# Patient Record
Sex: MALE | Race: WHITE | NOT HISPANIC OR LATINO | Employment: OTHER | URBAN - METROPOLITAN AREA
[De-identification: names, ages, dates, MRNs, and addresses within clinical notes are randomized per-mention and may not be internally consistent; named-entity substitution may affect disease eponyms.]

---

## 2018-02-05 ENCOUNTER — OFFICE VISIT (OUTPATIENT)
Dept: SPORTS MEDICINE | Facility: CLINIC | Age: 73
End: 2018-02-05
Payer: MEDICARE

## 2018-02-05 ENCOUNTER — HOSPITAL ENCOUNTER (OUTPATIENT)
Dept: RADIOLOGY | Facility: HOSPITAL | Age: 73
Discharge: HOME | End: 2018-02-05
Attending: ORTHOPAEDIC SURGERY
Payer: MEDICARE

## 2018-02-05 VITALS
DIASTOLIC BLOOD PRESSURE: 73 MMHG | HEART RATE: 80 BPM | HEIGHT: 69 IN | WEIGHT: 202 LBS | SYSTOLIC BLOOD PRESSURE: 124 MMHG | BODY MASS INDEX: 29.92 KG/M2

## 2018-02-05 DIAGNOSIS — M25.562 LEFT KNEE PAIN, UNSPECIFIED CHRONICITY: ICD-10-CM

## 2018-02-05 DIAGNOSIS — M17.12 PRIMARY OSTEOARTHRITIS OF LEFT KNEE: ICD-10-CM

## 2018-02-05 DIAGNOSIS — M25.562 LEFT KNEE PAIN, UNSPECIFIED CHRONICITY: Primary | ICD-10-CM

## 2018-02-05 PROCEDURE — 99999 PR PBB SHADOW E&M-NEW PATIENT-LVL III: CPT | Mod: PBBFAC,,, | Performed by: ORTHOPAEDIC SURGERY

## 2018-02-05 PROCEDURE — 3008F BODY MASS INDEX DOCD: CPT | Mod: S$GLB,,, | Performed by: ORTHOPAEDIC SURGERY

## 2018-02-05 PROCEDURE — 73564 X-RAY EXAM KNEE 4 OR MORE: CPT | Mod: 26,50,, | Performed by: RADIOLOGY

## 2018-02-05 PROCEDURE — 99204 OFFICE O/P NEW MOD 45 MIN: CPT | Mod: S$GLB,,, | Performed by: ORTHOPAEDIC SURGERY

## 2018-02-05 PROCEDURE — 1159F MED LIST DOCD IN RCRD: CPT | Mod: S$GLB,,, | Performed by: ORTHOPAEDIC SURGERY

## 2018-02-05 PROCEDURE — 73564 X-RAY EXAM KNEE 4 OR MORE: CPT | Mod: TC,50,FY,PO

## 2018-02-05 PROCEDURE — 1125F AMNT PAIN NOTED PAIN PRSNT: CPT | Mod: S$GLB,,, | Performed by: ORTHOPAEDIC SURGERY

## 2018-02-05 RX ORDER — AMLODIPINE BESYLATE 10 MG/1
10 TABLET ORAL DAILY
COMMUNITY

## 2018-02-05 RX ORDER — LISINOPRIL 10 MG/1
10 TABLET ORAL DAILY
COMMUNITY

## 2018-02-05 RX ORDER — OMEPRAZOLE 20 MG/1
20 CAPSULE, DELAYED RELEASE ORAL
COMMUNITY

## 2018-02-05 RX ORDER — LEVOTHYROXINE SODIUM 50 UG/1
TABLET ORAL DAILY
COMMUNITY

## 2018-02-05 RX ORDER — TAMSULOSIN HYDROCHLORIDE 0.4 MG/1
0.4 CAPSULE ORAL DAILY
COMMUNITY
Start: 2017-06-06

## 2018-02-05 RX ORDER — IBUPROFEN 800 MG/1
600 TABLET ORAL EVERY 6 HOURS PRN
COMMUNITY

## 2018-02-05 RX ORDER — ATORVASTATIN CALCIUM 20 MG/1
20 TABLET, FILM COATED ORAL DAILY
COMMUNITY

## 2018-02-05 NOTE — PROGRESS NOTES
Subjective:          Chief Complaint: Sudheer Gipson is a 72 y.o. male who had concerns including Pain of the Left Knee.    Pt reports left knee pain for many years. Has history of R TKA by Dr. Terry in [place]. Also reports history of left knee injections by Dr. Terry with last being ~2 mo prior.             Review of Systems   Constitution: Negative for fever and night sweats.   HENT: Negative for hearing loss.    Eyes: Negative for blurred vision and visual disturbance.   Cardiovascular: Negative for chest pain and leg swelling.   Respiratory: Negative for shortness of breath.    Endocrine: Negative for polyuria.   Hematologic/Lymphatic: Negative for bleeding problem.   Skin: Negative for rash.   Musculoskeletal: Positive for joint pain. Negative for back pain, joint swelling, muscle cramps and muscle weakness.   Gastrointestinal: Negative for melena.   Genitourinary: Negative for hematuria.   Neurological: Negative for loss of balance, numbness and paresthesias.   Psychiatric/Behavioral: Negative for altered mental status.       Pain Related Questions  Over the past 3 days, what was your average pain during activity? (I.e. running, jogging, walking, climbing stairs, getting dressed, ect.): 10  Over the past 3 days, what was your highest pain level?: 10  Over the past 3 days, what was your lowest pain level? : 3    Other  How many nights a week are you awakened by your affected body part?: 0  Was the patient's HEIGHT measured or patient reported?: Patient Reported  Was the patient's WEIGHT measured or patient reported?: Measured      Objective:        General: Sudheer is well-developed, well-nourished, appears stated age, in no acute distress, alert and oriented to time, place and person.     General    Vitals reviewed.  Constitutional: He is oriented to person, place, and time. He appears well-developed and well-nourished. No distress.   HENT:   Mouth/Throat: No oropharyngeal exudate.   Eyes: Right eye  exhibits no discharge. Left eye exhibits no discharge.   Neck: Normal range of motion.   Pulmonary/Chest: Effort normal and breath sounds normal. No respiratory distress.   Neurological: He is alert and oriented to person, place, and time. He has normal reflexes. No cranial nerve deficit. Coordination normal.   Psychiatric: He has a normal mood and affect. His behavior is normal. Judgment and thought content normal.     General Musculoskeletal Exam   Gait: normal       Right Knee Exam     Inspection   Erythema: absent  Scars: present  Swelling: absent  Effusion: effusion  Deformity: deformity  Bruising: absent    Tenderness   The patient is experiencing no tenderness.         Range of Motion   Extension: 0   Flexion: 130     Tests   Meniscus   Jael:  Medial - negative Lateral - negative  Ligament Examination PCL-Posterior Drawer: normal (0 to 2mm)     MCL - Valgus: normal (0 to 2mm)  LCL - Varus: normalPivot Shift: normal (Equal)Reverse Pivot Shift: normal (Equal)Dial Test at 30 degrees: normal (< 5 degrees)Dial Test at 90 degrees: normal (< 5 degrees)  Posterior Sag Test: negative  Posterolateral Corner: unstable (>15 degrees difference)  Patella   Patellar Apprehension: negative  Passive Patellar Tilt: neutral  Patellar Tracking: normal  Patellar Glide (quadrants): Lateral - 1   Medial - 2  Q-Angle at 90 degrees: normal  Patellar Grind: negative  J-Sign: none    Other   Meniscal Cyst: absent  Popliteal (Baker's) Cyst: absent  Sensation: normal    Left Knee Exam     Inspection   Erythema: absent  Scars: absent  Swelling: absent  Effusion: absent  Deformity: deformity  Bruising: absent    Tenderness   The patient tender to palpation of the medial joint line.    Range of Motion   Extension: 0   Flexion: 130     Tests   Meniscus   Jael:  Medial - positive Lateral - negative  Stability Lachman: normal (-1 to 2mm) PCL-Posterior Drawer: normal (0 to 2mm)  MCL - Valgus: normal (0 to 2mm)  LCL - Varus: normal (0 to  2mm)Pivot Shift: normal (Equal)Reverse Pivot Shift: normal (Equal)Dial Test at 30 degrees: normal (< 5 degrees)Dial Test at 90 degrees: normal (< 5 degrees)  Posterior Sag Test: negative  Posterolateral Corner: unstable (>15 degrees difference)  Patella   Patellar Apprehension: negative  Passive Patellar Tilt: neutral  Patellar Tracking: normal  Patellar Glide (Quadrants): Lateral - 1 Medial - 2  Q-Angle at 90 degrees: normal  Patellar Grind: negative  J-Sign: J sign absent    Other   Meniscal Cyst: absent  Popliteal (Baker's) Cyst: absent  Sensation: normal    Right Hip Exam     Tests   Cynthia: negative  Left Hip Exam     Tests   Cynthia: negative          Muscle Strength   Right Lower Extremity   Hip Abduction: 5/5   Quadriceps:  5/5   Hamstrin/5   Left Lower Extremity   Hip Abduction: 5/5   Quadriceps:  5/5   Hamstrin/5     Reflexes     Left Side  Quadriceps:  2+  Achilles:  2+    Right Side   Quadriceps:  2+  Achilles:  2+    Vascular Exam     Right Pulses  Dorsalis Pedis:      2+  Posterior Tibial:      2+        Left Pulses  Dorsalis Pedis:      2+  Posterior Tibial:      2+        XRAY AP LAT and Merchant views taken and reviewed personally by me show end-stage osteoarthritis of left knee;  There is no evidence of fracture or dislocation        Assessment:       Encounter Diagnoses   Name Primary?    Left knee pain, unspecified chronicity Yes    Primary osteoarthritis of left knee           Plan:       1. IKDC, SF-12 and KOOS was filled out today in clinic.     RTC in 4 weeks with Mid-level provider Patient will not fill out IKDC, SF-12 and KOOS on return for pre op H&P.    2. We reviewed with Sudheer today, the pathology and natural history of his diagnosis. We have discussed a variety of treatment options including medications, physical therapy and other alternative treatments. I also explained the indications, risks and benefits of surgery. After discussion, Sudheer decided to proceed with surgery. The  decision was made to go forward with     1. Left total knee arthroplasty (Conformis)    The details of the surgical procedure were explained, including the location of probable incisions and a description of likely hardware and/or grafts to be used.  The patient understands the likely convalescence after surgery.  Also, we have thoroughly discussed the risks, benefits and alternatives to surgery, including, but not limited to, the risk of infection, joint stiffness, blood clot (including DVT and/or pulmonary embolus), neurologic and vascular injury.  It was explained that, if tissue has been repaired or reconstructed, there is a chance of failure, which may require further management.      All of the patient's questions were answered and informed consent was obtained. The patient will contact us if they have any questions or concerns in the interim.      [facility] patient questionnaires have been collected today.

## 2018-02-05 NOTE — LETTER
February 5, 2018        Adrian Haskins MD  [address]  [facility] [address]             [facility] [address]  [facility] [address]  Phone: [phone]   Patient: Sudheer Gipson   MR Number: [id number]   YOB: 1945   Date of Visit: 2/5/2018       Dear Dr. Haskins:    Thank you for referring Sudheer Gipson to me for evaluation. Below are the relevant portions of my assessment and plan of care.        Encounter Diagnoses   Name Primary?    Left knee pain, unspecified chronicity Yes    Primary osteoarthritis of left knee             1. IKDC, SF-12 and KOOS was filled out today in clinic.     RTC in 4 weeks with Mid-level provider Patient will not fill out IKDC, SF-12 and KOOS on return for pre op H&P.    2. We reviewed with Sudheer today, the pathology and natural history of his diagnosis. We have discussed a variety of treatment options including medications, physical therapy and other alternative treatments. I also explained the indications, risks and benefits of surgery. After discussion, Sudheer decided to proceed with surgery. The decision was made to go forward with     1. Left total knee arthroplasty (Conformis)    The details of the surgical procedure were explained, including the location of probable incisions and a description of likely hardware and/or grafts to be used.  The patient understands the likely convalescence after surgery.  Also, we have thoroughly discussed the risks, benefits and alternatives to surgery, including, but not limited to, the risk of infection, joint stiffness, blood clot (including DVT and/or pulmonary embolus), neurologic and vascular injury.  It was explained that, if tissue has been repaired or reconstructed, there is a chance of failure, which may require further management.      All of the patient's questions were answered and informed consent was obtained. The patient will contact us if they have any questions or concerns in the  interim.    Pre operative clearance with Dr. Haskins    If you have questions, please do not hesitate to call me. I look forward to following Bill along with you.    Sincerely,      MD ESDRAS Merrill

## 2018-02-12 ENCOUNTER — HOSPITAL ENCOUNTER (OUTPATIENT)
Dept: RADIOLOGY | Facility: HOSPITAL | Age: 73
Discharge: HOME | End: 2018-02-12
Attending: ORTHOPAEDIC SURGERY
Payer: MEDICARE

## 2018-02-12 ENCOUNTER — PATIENT MESSAGE (OUTPATIENT)
Dept: SPORTS MEDICINE | Facility: CLINIC | Age: 73
End: 2018-02-12

## 2018-02-12 DIAGNOSIS — M25.562 LEFT KNEE PAIN, UNSPECIFIED CHRONICITY: ICD-10-CM

## 2018-02-12 DIAGNOSIS — M17.12 PRIMARY OSTEOARTHRITIS OF LEFT KNEE: ICD-10-CM

## 2018-02-12 PROCEDURE — 73700 CT LOWER EXTREMITY W/O DYE: CPT | Mod: 26,LT,, | Performed by: RADIOLOGY

## 2018-02-12 PROCEDURE — 73700 CT LOWER EXTREMITY W/O DYE: CPT | Mod: TC,LT

## 2018-02-14 ENCOUNTER — TELEPHONE (OUTPATIENT)
Dept: SPORTS MEDICINE | Facility: CLINIC | Age: 73
End: 2018-02-14

## 2018-02-14 DIAGNOSIS — M17.12 PRIMARY LOCALIZED OSTEOARTHROSIS OF LEFT LOWER LEG: Primary | ICD-10-CM

## 2018-02-14 NOTE — TELEPHONE ENCOUNTER
----- Message from Brooke Reyes MA sent at 2/12/2018  9:17 AM CST -----  Contact: self      ----- Message -----  From: Benjy Simeon  Sent: 2/12/2018   8:54 AM  To: Frankie RIVERO Staff    Pt would like to schedule his lt knee sx on 3/22. Pt can be reached at [phone].

## 2018-03-19 ENCOUNTER — TELEPHONE (OUTPATIENT)
Dept: SPORTS MEDICINE | Facility: CLINIC | Age: 73
End: 2018-03-19

## 2018-03-19 ENCOUNTER — OFFICE VISIT (OUTPATIENT)
Dept: SPORTS MEDICINE | Facility: CLINIC | Age: 73
End: 2018-03-19
Payer: MEDICARE

## 2018-03-19 ENCOUNTER — ANESTHESIA EVENT (OUTPATIENT)
Dept: SURGERY | Facility: OTHER | Age: 73
End: 2018-03-19
Payer: MEDICARE

## 2018-03-19 ENCOUNTER — HOSPITAL ENCOUNTER (OUTPATIENT)
Dept: PREADMISSION TESTING | Facility: OTHER | Age: 73
Discharge: HOME | End: 2018-03-19
Attending: ORTHOPAEDIC SURGERY
Payer: MEDICARE

## 2018-03-19 VITALS
TEMPERATURE: 98 F | DIASTOLIC BLOOD PRESSURE: 68 MMHG | HEART RATE: 67 BPM | WEIGHT: 197 LBS | BODY MASS INDEX: 29.18 KG/M2 | OXYGEN SATURATION: 97 % | SYSTOLIC BLOOD PRESSURE: 136 MMHG | HEIGHT: 69 IN

## 2018-03-19 VITALS
BODY MASS INDEX: 29.18 KG/M2 | HEART RATE: 73 BPM | SYSTOLIC BLOOD PRESSURE: 133 MMHG | HEIGHT: 69 IN | DIASTOLIC BLOOD PRESSURE: 79 MMHG | WEIGHT: 197 LBS

## 2018-03-19 DIAGNOSIS — M17.12 PRIMARY OSTEOARTHRITIS OF LEFT KNEE: ICD-10-CM

## 2018-03-19 DIAGNOSIS — M17.12 PRIMARY OSTEOARTHRITIS OF LEFT KNEE: Primary | ICD-10-CM

## 2018-03-19 LAB
ABO + RH BLD: NORMAL
BLD GP AB SCN CELLS X3 SERPL QL: NORMAL

## 2018-03-19 PROCEDURE — 36415 COLL VENOUS BLD VENIPUNCTURE: CPT

## 2018-03-19 PROCEDURE — 86901 BLOOD TYPING SEROLOGIC RH(D): CPT

## 2018-03-19 PROCEDURE — 99999 PR PBB SHADOW E&M-EST. PATIENT-LVL IV: CPT | Mod: PBBFAC,,, | Performed by: PHYSICIAN ASSISTANT

## 2018-03-19 PROCEDURE — 99499 UNLISTED E&M SERVICE: CPT | Mod: S$GLB,,, | Performed by: PHYSICIAN ASSISTANT

## 2018-03-19 RX ORDER — ACETAMINOPHEN 160 MG/5ML
200 SUSPENSION, ORAL (FINAL DOSE FORM) ORAL DAILY
COMMUNITY

## 2018-03-19 RX ORDER — ACETAMINOPHEN 500 MG
5000 TABLET ORAL DAILY
COMMUNITY

## 2018-03-19 RX ORDER — LIDOCAINE HYDROCHLORIDE 10 MG/ML
0.5 INJECTION, SOLUTION EPIDURAL; INFILTRATION; INTRACAUDAL; PERINEURAL ONCE
Status: CANCELLED | OUTPATIENT
Start: 2018-03-19 | End: 2018-03-19

## 2018-03-19 RX ORDER — CELECOXIB 200 MG/1
200 CAPSULE ORAL 2 TIMES DAILY
Qty: 60 CAPSULE | Refills: 0 | Status: SHIPPED | OUTPATIENT
Start: 2018-03-19 | End: 2018-04-19 | Stop reason: SDUPTHER

## 2018-03-19 RX ORDER — PREGABALIN 75 MG/1
75 CAPSULE ORAL ONCE
Status: CANCELLED | OUTPATIENT
Start: 2018-03-19 | End: 2018-03-19

## 2018-03-19 RX ORDER — MUPIROCIN 20 MG/G
OINTMENT TOPICAL
Status: CANCELLED | OUTPATIENT
Start: 2018-03-19

## 2018-03-19 RX ORDER — SODIUM CHLORIDE 0.9 % (FLUSH) 0.9 %
5 SYRINGE (ML) INJECTION
Status: CANCELLED | OUTPATIENT
Start: 2018-03-19

## 2018-03-19 RX ORDER — OXYCODONE AND ACETAMINOPHEN 10; 325 MG/1; MG/1
1 TABLET ORAL EVERY 4 HOURS PRN
Qty: 60 TABLET | Refills: 0 | Status: SHIPPED | OUTPATIENT
Start: 2018-03-19 | End: 2018-06-22

## 2018-03-19 RX ORDER — OMEGA-3 FATTY ACIDS/FISH OIL 360-1200MG
CAPSULE ORAL
Status: ON HOLD | COMMUNITY
End: 2018-06-08

## 2018-03-19 RX ORDER — TRAMADOL HYDROCHLORIDE 50 MG/1
50 TABLET ORAL EVERY 6 HOURS PRN
Qty: 30 TABLET | Refills: 0 | Status: SHIPPED | OUTPATIENT
Start: 2018-03-19 | End: 2018-03-29

## 2018-03-19 RX ORDER — PROMETHAZINE HYDROCHLORIDE 25 MG/1
25 TABLET ORAL EVERY 4 HOURS PRN
Qty: 30 TABLET | Refills: 0 | Status: SHIPPED | OUTPATIENT
Start: 2018-03-19

## 2018-03-19 RX ORDER — SODIUM CHLORIDE, SODIUM LACTATE, POTASSIUM CHLORIDE, CALCIUM CHLORIDE 600; 310; 30; 20 MG/100ML; MG/100ML; MG/100ML; MG/100ML
INJECTION, SOLUTION INTRAVENOUS CONTINUOUS
Status: CANCELLED | OUTPATIENT
Start: 2018-03-19

## 2018-03-19 RX ORDER — MIDAZOLAM HYDROCHLORIDE 5 MG/ML
4 INJECTION INTRAMUSCULAR; INTRAVENOUS ONCE AS NEEDED
Status: CANCELLED | OUTPATIENT
Start: 2018-03-19 | End: 2018-03-19

## 2018-03-19 NOTE — DISCHARGE INSTRUCTIONS
PRE-ADMIT TESTING -  [phone]    [address]  [place] [facility]          Your surgery has been scheduled at [facility]. We are pleased to have the opportunity to serve you. For Further Information please call [phone].    On the day of surgery please report to the Information Desk on the 1st floor.    · CONTACT YOUR PHYSICIAN'S OFFICE THE DAY PRIOR TO YOUR SURGERY TO OBTAIN YOUR ARRIVAL TIME.     · The evening before surgery do not eat anything after 9 p.m. ( this includes hard candy, chewing gum and mints).  You may only have GATORADE, POWERADE AND WATER  from 9 p.m. until you leave your home.   DO NOT DRINK ANY LIQUIDS ON THE WAY TO THE HOSPITAL.      SPECIAL MEDICATION INSTRUCTIONS: TAKE medications checked off by the Anesthesiologist on your Medication List.    Angiogram Patients: Take medications as instructed by your physician, including aspirin.     Surgery Patients:    If you take ASPIRIN - Your PHYSICIAN/SURGEON will need to inform you IF/OR when you need to stop taking aspirin prior to your surgery.     Do Not take any medications containing IBUPROFEN.  Do Not Wear any make-up or dark nail polish   (especially eye make-up) to surgery. If you come to surgery with makeup on you will be required to remove the makeup or nail polish.    Do not shave your surgical area at least 5 days prior to your surgery. The surgical prep will be performed at the hospital according to Infection Control regulations.    Leave all valuables at home.   Do Not wear any jewelry or watches, including any metal in body piercings.  Contact Lens must be removed before surgery. Either do not wear the contact lens or bring a case and solution for storage.  Please bring a container for eyeglasses or dentures as required.  Bring any paperwork your physician has provided, such as consent forms,  history and physicals, doctor's orders, etc.   Bring comfortable clothes that are loose fitting to wear upon  discharge. Take into consideration the type of surgery being performed.  Maintain your diet as advised per your physician the day prior to surgery.      Adequate rest the night before surgery is advised.   Park in the Parking lot behind the hospital or in the [facility] across the street from the parking lot. Parking is complimentary.  If you will be discharged the same day as your procedure, please arrange for a responsible adult to drive you home or to accompany you if traveling by taxi.   YOU WILL NOT BE PERMITTED TO DRIVE OR TO LEAVE THE HOSPITAL ALONE AFTER SURGERY.   It is strongly recommended that you arrange for someone to remain with you for the first 24 hrs following your surgery.       Thank you for your cooperation.  The Staff of [facility].        Bathing Instructions                                                                 Please shower the evening before and morning of your procedure with    ANTIBACTERIAL SOAP. ( DIAL, etc )  Concentrate on the surgical area   for at least 3 minutes and rinse completely. Dry off as usual.   Do not use any deodorant, powder, body lotions, perfume, after shave or    cologne.

## 2018-03-19 NOTE — H&P
"Sanjeev Gipson  is here for a completion of his perioperative paperwork. he  Is scheduled to undergo 1. Left total knee arthroplasty (Conformis) on 3/22/2018.      He  does need clearance for this procedure. Adrian Haskins MD states, Mr. Gipson appears to be stable and is okay to proceed with his surgery with acceptable cardiovascular risk".    Risks, indications and benefits of the surgical procedure were discussed with the patient. All questions with regard to surgery, rehab, expected return to functional activities, activities of daily living and recreational endeavors were answered to his satisfaction.    Patient was informed and understands the risks of surgery are greater for patients with a current condition or history of heart disease, obesity, clotting disorders, recurrent infections, steroid use, current or past smoking, and factors such as sedentary lifestyle and noncompliance with medications, therapy or follow-up. The degree of the increased risk is hard to estimate with any degree of precision.    Once no other questions were asked, a brief history and physical exam was then performed.    PAST MEDICAL HISTORY: History reviewed. No pertinent past medical history.  PAST SURGICAL HISTORY: History reviewed. No pertinent surgical history.  FAMILY HISTORY: History reviewed. No pertinent family history.  SOCIAL HISTORY:   Social History     Social History    Marital status:      Spouse name: N/A    Number of children: N/A    Years of education: N/A     Occupational History    Not on file.     Social History Main Topics    Smoking status: Not on file    Smokeless tobacco: Not on file    Alcohol use Not on file    Drug use: Unknown    Sexual activity: Not on file     Other Topics Concern    Not on file     Social History Narrative    No narrative on file       MEDICATIONS:   Current Outpatient Prescriptions:     amLODIPine (NORVASC) 10 MG tablet, Take 10 mg by mouth once daily., Disp: " , Rfl:     atorvastatin (LIPITOR) 20 MG tablet, Take 20 mg by mouth once daily., Disp: , Rfl:     ibuprofen (ADVIL,MOTRIN) 800 MG tablet, 800 mg every 6 (six) hours as needed for Pain., Disp: , Rfl:     levothyroxine (SYNTHROID) 50 MCG tablet, Take by mouth once daily., Disp: , Rfl:     lisinopril 10 MG tablet, Take 10 mg by mouth once daily., Disp: , Rfl:     omeprazole (PRILOSEC) 20 MG capsule, Take 20 mg by mouth as needed., Disp: , Rfl:     tamsulosin (FLOMAX) 0.4 mg Cp24, Take 0.4 mg by mouth once daily., Disp: , Rfl:     ASPIRIN ORAL, Take 81 mg by mouth., Disp: , Rfl:     cholecalciferol, vitamin D3, (VITAMIN D3) 5,000 unit Tab, Take 5,000 Units by mouth once daily., Disp: , Rfl:     coenzyme Q10 200 mg capsule, Take 200 mg by mouth once daily., Disp: , Rfl:     omega-3s-dha-epa-fish oil 720-1,200 mg Cap, Take by mouth., Disp: , Rfl:   ALLERGIES: Review of patient's allergies indicates:  No Known Allergies    Review of Systems   Constitution: Negative. Negative for chills, fever and night sweats.   HENT: Negative for congestion and headaches.    Eyes: Negative for blurred vision, left vision loss and right vision loss.   Cardiovascular: Negative for chest pain and syncope.   Respiratory: Negative for cough and shortness of breath.    Endocrine: Negative for polydipsia, polyphagia and polyuria.   Hematologic/Lymphatic: Negative for bleeding problem. Does not bruise/bleed easily.   Skin: Negative for dry skin, itching and rash.   Musculoskeletal: Negative for falls and muscle weakness.   Gastrointestinal: Negative for abdominal pain and bowel incontinence.   Genitourinary: Negative for bladder incontinence and nocturia.   Neurological: Negative for disturbances in coordination, loss of balance and seizures.   Psychiatric/Behavioral: Negative for depression. The patient does not have insomnia.    Allergic/Immunologic: Negative for hives and persistent infections.     PHYSICAL EXAM:  GEN: A&Ox3, WD WN  NAD  HEENT: WNL  CHEST: CTAB, no W/R/R  HEART: RRR, no M/R/G   ABD: Soft, NT ND, BS x4 QUADS  MS: Refer to previous note for detailed MS exam  NEURO: CN II-XII intact       The surgical consent was then reviewed with the patient, who agreed with all the contents of the consent form and it was signed. he was then given the [facility] surgery packet to bring with him to [facility] for the anesthesia portion of his perioperative paperwork.     PHYSICAL THERAPY:  He was also instructed regarding physical therapy and will begin POD # 1-3. He was given a copy of the original prescription to schedule. Another copy of this prescription was also faxed to Reyna Ward PT.    POST OP CARE:instructions were reviewed including care of the wound and dressing after surgery and when he can shower.     PAIN MANAGEMENT: Sanjeev Gipson was also given a pain management regime, which includes the TENS unit given to him by Brennon Barrett along with the education required for its use. He was also instructed regarding the Polar ice unit that will be in place after surgery and his postoperative pain medications.     PAIN MEDICATION:  Percocet 10/325mg 1 po q 4-6 hours prn pain  Ultram 50 mg one p.o. q.4-6 hours p.r.n. breakthrough pain,   Phenergan 25 mg one p.o. q.4-6 hours p.r.n. nausea and vomiting.  Celebrex 200 mg BID    Patient denies history of seizures.     Patient was instructed to buy and take:  Aspirin 325mg daily x 6 weeks for DVT prophylaxis starting on the evening after surgery.  Patient will also use bilateral TEDs on lower extremities, SCDs during surgery, and early ambulation post-op. If the patient was previously taking 81mg baby aspirin, they were told to not take it will using the above stated aspirin and to restart the 81mg aspirin after completion of the aspirin dose.       Patient was also told to buy over the counter Prilosec medication and take it once daily for GI protection as long as they are taking NSAIDs or  Aspirin.    DVT prophylaxis was discussed with the patient today including risk factors for developing DVTs and history of DVTs. The patient was asked if any specific recommendations were given from the doctor/s that did pre-operative surgical clearance.      The patient was told that narcotic pain medications may make them drowsy and instructions were given to not sign legal documents, drive or operate heavy machinery, cars, or equipment while under the influence of narcotic medications.     As there were no other questions to be asked, he was given my business card along with Jennifer David MD business card if he has any questions or concerns prior to surgery or in the postop period.

## 2018-03-19 NOTE — TELEPHONE ENCOUNTER
Called and spoke w/ [place] w/ [facility]. They currently do not have P.T./O.T. In the [facility] at this time. I will inform Khurram Payne PA-C and the patient tomorrow. Patient needs to pick another facility.              ----- Message from Lyla Granados sent at 3/19/2018 12:25 PM CDT -----  Contact: [facility] at [facility] stated she received the order for pt but they will not be able to treat pt because they do not have OT or Home Health

## 2018-03-19 NOTE — PRE-PROCEDURE INSTRUCTIONS
Reviewed  knee arthroplasty discharge instructions and demonstrated the postoperative equipment (polar ice and BREG t-scope knee brace), with verbalized understanding per patient.  The patient has a walker at home from previous knee replacement.                         .

## 2018-03-19 NOTE — ANESTHESIA PREPROCEDURE EVALUATION
03/19/2018  Sanjeev Gipson is a 72 y.o., male.    Anesthesia Evaluation         Review of Systems  Anesthesia Hx:  Hx of Anesthetic complications History of postop vomiting   Hematology/Oncology:  Hematology Normal   Oncology Normal     EENT/Dental:EENT/Dental Normal   Cardiovascular:   Hypertension   Pulmonary:  Pulmonary Normal    Renal/:  Renal/ Normal     Hepatic/GI:  Hepatic/GI Normal    Musculoskeletal:   Arthritis     Neurological:  Neurology Normal    Endocrine:   Hypothyroidism    Dermatological:  Skin Normal    Psych:  Psychiatric Normal           Physical Exam  General:  Well nourished    Airway/Jaw/Neck:  Airway Findings: Mouth Opening: Normal Mallampati: II      Dental:  Dental Findings: In tact        Mental Status:  Mental Status Findings:  Cooperative, Alert and Oriented         Anesthesia Plan  Type of Anesthesia, risks & benefits discussed:  Anesthesia Type:  general  Patient's Preference:   Intra-op Monitoring Plan: standard ASA monitors  Intra-op Monitoring Plan Comments:   Post Op Pain Control Plan: multimodal analgesia and peripheral nerve block  Post Op Pain Control Plan Comments:   Induction:   IV  Beta Blocker:         Informed Consent: Patient understands risks and agrees with Anesthesia plan.  Questions answered. Anesthesia consent signed with patient.  ASA Score: 2     Day of Surgery Review of History & Physical:    H&P update referred to the surgeon.     Anesthesia Plan Notes: Cleared by Cardiologist in [facility] From Anesthesia Perspective.

## 2018-03-20 ENCOUNTER — TELEPHONE (OUTPATIENT)
Dept: SPORTS MEDICINE | Facility: CLINIC | Age: 73
End: 2018-03-20

## 2018-03-20 ENCOUNTER — PATIENT MESSAGE (OUTPATIENT)
Dept: SURGERY | Facility: OTHER | Age: 73
End: 2018-03-20

## 2018-03-20 NOTE — TELEPHONE ENCOUNTER
Called and spoke w/ patient. Orders were resent to [facility] and [facility]. Patient found his DMV form for a handicap tag.

## 2018-03-21 ENCOUNTER — PATIENT MESSAGE (OUTPATIENT)
Dept: SURGERY | Facility: OTHER | Age: 73
End: 2018-03-21

## 2018-03-22 ENCOUNTER — HOSPITAL ENCOUNTER (OUTPATIENT)
Facility: OTHER | Age: 73
LOS: 1 days | Discharge: HOME | End: 2018-03-23
Attending: ORTHOPAEDIC SURGERY | Admitting: ORTHOPAEDIC SURGERY
Payer: MEDICARE

## 2018-03-22 ENCOUNTER — ANESTHESIA (OUTPATIENT)
Dept: SURGERY | Facility: OTHER | Age: 73
End: 2018-03-22
Payer: MEDICARE

## 2018-03-22 DIAGNOSIS — M17.12 PRIMARY OSTEOARTHRITIS OF LEFT KNEE: ICD-10-CM

## 2018-03-22 DIAGNOSIS — I10 ESSENTIAL HYPERTENSION: ICD-10-CM

## 2018-03-22 DIAGNOSIS — M25.562 LEFT KNEE PAIN, UNSPECIFIED CHRONICITY: Primary | ICD-10-CM

## 2018-03-22 DIAGNOSIS — E03.8 OTHER SPECIFIED HYPOTHYROIDISM: ICD-10-CM

## 2018-03-22 LAB
HCT VFR BLD AUTO: 44.4 %
HGB BLD-MCNC: 14.8 G/DL

## 2018-03-22 PROCEDURE — 25000003 PHARM REV CODE 250: Performed by: PHYSICIAN ASSISTANT

## 2018-03-22 PROCEDURE — 36000711: Performed by: ORTHOPAEDIC SURGERY

## 2018-03-22 PROCEDURE — 99219 PR INITIAL OBSERVATION CARE,LEVL II: CPT | Mod: ,,, | Performed by: PHYSICIAN ASSISTANT

## 2018-03-22 PROCEDURE — 71000033 HC RECOVERY, INTIAL HOUR: Performed by: ORTHOPAEDIC SURGERY

## 2018-03-22 PROCEDURE — 36000710: Performed by: ORTHOPAEDIC SURGERY

## 2018-03-22 PROCEDURE — 85018 HEMOGLOBIN: CPT

## 2018-03-22 PROCEDURE — G8979 MOBILITY GOAL STATUS: HCPCS | Mod: CH

## 2018-03-22 PROCEDURE — C1729 CATH, DRAINAGE: HCPCS | Performed by: ORTHOPAEDIC SURGERY

## 2018-03-22 PROCEDURE — 25000003 PHARM REV CODE 250: Performed by: ANESTHESIOLOGY

## 2018-03-22 PROCEDURE — 25000003 PHARM REV CODE 250: Performed by: ORTHOPAEDIC SURGERY

## 2018-03-22 PROCEDURE — C9290 INJ, BUPIVACAINE LIPOSOME: HCPCS | Performed by: PHYSICIAN ASSISTANT

## 2018-03-22 PROCEDURE — C1713 ANCHOR/SCREW BN/BN,TIS/BN: HCPCS | Performed by: ORTHOPAEDIC SURGERY

## 2018-03-22 PROCEDURE — 97110 THERAPEUTIC EXERCISES: CPT

## 2018-03-22 PROCEDURE — 25000003 PHARM REV CODE 250: Performed by: SPECIALIST

## 2018-03-22 PROCEDURE — 63600175 PHARM REV CODE 636 W HCPCS: Performed by: PHYSICIAN ASSISTANT

## 2018-03-22 PROCEDURE — 27447 TOTAL KNEE ARTHROPLASTY: CPT | Mod: AS,LT,, | Performed by: PHYSICIAN ASSISTANT

## 2018-03-22 PROCEDURE — 71000039 HC RECOVERY, EACH ADD'L HOUR: Performed by: ORTHOPAEDIC SURGERY

## 2018-03-22 PROCEDURE — V2790 AMNIOTIC MEMBRANE: HCPCS | Performed by: ORTHOPAEDIC SURGERY

## 2018-03-22 PROCEDURE — 63600175 PHARM REV CODE 636 W HCPCS: Performed by: SPECIALIST

## 2018-03-22 PROCEDURE — 97116 GAIT TRAINING THERAPY: CPT | Mod: 59

## 2018-03-22 PROCEDURE — 97161 PT EVAL LOW COMPLEX 20 MIN: CPT

## 2018-03-22 PROCEDURE — C1776 JOINT DEVICE (IMPLANTABLE): HCPCS | Performed by: ORTHOPAEDIC SURGERY

## 2018-03-22 PROCEDURE — 25000003 PHARM REV CODE 250: Performed by: NURSE ANESTHETIST, CERTIFIED REGISTERED

## 2018-03-22 PROCEDURE — 37000008 HC ANESTHESIA 1ST 15 MINUTES: Performed by: ORTHOPAEDIC SURGERY

## 2018-03-22 PROCEDURE — 94761 N-INVAS EAR/PLS OXIMETRY MLT: CPT

## 2018-03-22 PROCEDURE — 27201423 OPTIME MED/SURG SUP & DEVICES STERILE SUPPLY: Performed by: ORTHOPAEDIC SURGERY

## 2018-03-22 PROCEDURE — 99900035 HC TECH TIME PER 15 MIN (STAT)

## 2018-03-22 PROCEDURE — 63600175 PHARM REV CODE 636 W HCPCS: Performed by: ANESTHESIOLOGY

## 2018-03-22 PROCEDURE — 97530 THERAPEUTIC ACTIVITIES: CPT

## 2018-03-22 PROCEDURE — 37000009 HC ANESTHESIA EA ADD 15 MINS: Performed by: ORTHOPAEDIC SURGERY

## 2018-03-22 PROCEDURE — G8978 MOBILITY CURRENT STATUS: HCPCS | Mod: CJ

## 2018-03-22 PROCEDURE — 63600175 PHARM REV CODE 636 W HCPCS: Performed by: ORTHOPAEDIC SURGERY

## 2018-03-22 PROCEDURE — 63600175 PHARM REV CODE 636 W HCPCS: Performed by: NURSE ANESTHETIST, CERTIFIED REGISTERED

## 2018-03-22 PROCEDURE — 36415 COLL VENOUS BLD VENIPUNCTURE: CPT

## 2018-03-22 PROCEDURE — 85014 HEMATOCRIT: CPT

## 2018-03-22 PROCEDURE — 27447 TOTAL KNEE ARTHROPLASTY: CPT | Mod: LT,,, | Performed by: ORTHOPAEDIC SURGERY

## 2018-03-22 DEVICE — CEMENT BONE IMPLANT: Type: IMPLANTABLE DEVICE | Site: KNEE | Status: FUNCTIONAL

## 2018-03-22 DEVICE — MATRIX REGENERATIVE CLARIX FLO: Type: IMPLANTABLE DEVICE | Site: KNEE | Status: FUNCTIONAL

## 2018-03-22 DEVICE — IMPLANTABLE DEVICE: Type: IMPLANTABLE DEVICE | Site: KNEE | Status: FUNCTIONAL

## 2018-03-22 RX ORDER — PROPOFOL 10 MG/ML
VIAL (ML) INTRAVENOUS
Status: DISCONTINUED | OUTPATIENT
Start: 2018-03-22 | End: 2018-03-22

## 2018-03-22 RX ORDER — AMLODIPINE BESYLATE 5 MG/1
10 TABLET ORAL DAILY
Status: DISCONTINUED | OUTPATIENT
Start: 2018-03-22 | End: 2018-03-22

## 2018-03-22 RX ORDER — DEXAMETHASONE SODIUM PHOSPHATE 4 MG/ML
INJECTION, SOLUTION INTRA-ARTICULAR; INTRALESIONAL; INTRAMUSCULAR; INTRAVENOUS; SOFT TISSUE
Status: DISCONTINUED | OUTPATIENT
Start: 2018-03-22 | End: 2018-03-22

## 2018-03-22 RX ORDER — HYDROMORPHONE HYDROCHLORIDE 2 MG/ML
0.4 INJECTION, SOLUTION INTRAMUSCULAR; INTRAVENOUS; SUBCUTANEOUS EVERY 5 MIN PRN
Status: DISCONTINUED | OUTPATIENT
Start: 2018-03-22 | End: 2018-03-22 | Stop reason: HOSPADM

## 2018-03-22 RX ORDER — GLYCOPYRROLATE 0.2 MG/ML
INJECTION INTRAMUSCULAR; INTRAVENOUS
Status: DISCONTINUED | OUTPATIENT
Start: 2018-03-22 | End: 2018-03-22

## 2018-03-22 RX ORDER — TRANEXAMIC ACID 100 MG/ML
1000 INJECTION, SOLUTION INTRAVENOUS ONCE
Status: COMPLETED | OUTPATIENT
Start: 2018-03-22 | End: 2018-03-22

## 2018-03-22 RX ORDER — ATORVASTATIN CALCIUM 20 MG/1
20 TABLET, FILM COATED ORAL DAILY
Status: DISCONTINUED | OUTPATIENT
Start: 2018-03-22 | End: 2018-03-23 | Stop reason: HOSPADM

## 2018-03-22 RX ORDER — SODIUM CHLORIDE 0.9 % (FLUSH) 0.9 %
5 SYRINGE (ML) INJECTION
Status: DISCONTINUED | OUTPATIENT
Start: 2018-03-22 | End: 2018-03-22

## 2018-03-22 RX ORDER — MORPHINE SULFATE 10 MG/ML
4 INJECTION INTRAMUSCULAR; INTRAVENOUS; SUBCUTANEOUS
Status: DISCONTINUED | OUTPATIENT
Start: 2018-03-22 | End: 2018-03-23 | Stop reason: HOSPADM

## 2018-03-22 RX ORDER — LEVOTHYROXINE SODIUM 50 UG/1
50 TABLET ORAL DAILY
Status: DISCONTINUED | OUTPATIENT
Start: 2018-03-23 | End: 2018-03-23 | Stop reason: HOSPADM

## 2018-03-22 RX ORDER — MUPIROCIN 20 MG/G
OINTMENT TOPICAL
Status: DISCONTINUED | OUTPATIENT
Start: 2018-03-22 | End: 2018-03-22

## 2018-03-22 RX ORDER — CEFAZOLIN SODIUM 1 G/3ML
2 INJECTION, POWDER, FOR SOLUTION INTRAMUSCULAR; INTRAVENOUS
Status: DISCONTINUED | OUTPATIENT
Start: 2018-03-22 | End: 2018-03-22

## 2018-03-22 RX ORDER — MIDAZOLAM HYDROCHLORIDE 1 MG/ML
5 INJECTION INTRAMUSCULAR; INTRAVENOUS ONCE
Status: COMPLETED | OUTPATIENT
Start: 2018-03-22 | End: 2018-03-22

## 2018-03-22 RX ORDER — LIDOCAINE HYDROCHLORIDE 10 MG/ML
0.5 INJECTION, SOLUTION EPIDURAL; INFILTRATION; INTRACAUDAL; PERINEURAL ONCE
Status: DISCONTINUED | OUTPATIENT
Start: 2018-03-22 | End: 2018-03-22

## 2018-03-22 RX ORDER — ROPIVACAINE HYDROCHLORIDE 5 MG/ML
INJECTION, SOLUTION EPIDURAL; INFILTRATION; PERINEURAL
Status: DISCONTINUED | OUTPATIENT
Start: 2018-03-22 | End: 2018-03-22

## 2018-03-22 RX ORDER — FENTANYL CITRATE 50 UG/ML
25 INJECTION, SOLUTION INTRAMUSCULAR; INTRAVENOUS EVERY 5 MIN PRN
Status: DISCONTINUED | OUTPATIENT
Start: 2018-03-22 | End: 2018-03-22 | Stop reason: HOSPADM

## 2018-03-22 RX ORDER — FENTANYL CITRATE 50 UG/ML
100 INJECTION, SOLUTION INTRAMUSCULAR; INTRAVENOUS EVERY 5 MIN PRN
Status: COMPLETED | OUTPATIENT
Start: 2018-03-22 | End: 2018-03-22

## 2018-03-22 RX ORDER — LISINOPRIL 10 MG/1
10 TABLET ORAL DAILY
Status: DISCONTINUED | OUTPATIENT
Start: 2018-03-22 | End: 2018-03-22

## 2018-03-22 RX ORDER — BACITRACIN 50000 [IU]/1
INJECTION, POWDER, FOR SOLUTION INTRAMUSCULAR
Status: DISCONTINUED | OUTPATIENT
Start: 2018-03-22 | End: 2018-03-22 | Stop reason: HOSPADM

## 2018-03-22 RX ORDER — MEPERIDINE HYDROCHLORIDE 50 MG/ML
12.5 INJECTION INTRAMUSCULAR; INTRAVENOUS; SUBCUTANEOUS ONCE AS NEEDED
Status: DISCONTINUED | OUTPATIENT
Start: 2018-03-22 | End: 2018-03-22 | Stop reason: HOSPADM

## 2018-03-22 RX ORDER — NEOSTIGMINE METHYLSULFATE 1 MG/ML
INJECTION, SOLUTION INTRAVENOUS
Status: DISCONTINUED | OUTPATIENT
Start: 2018-03-22 | End: 2018-03-22

## 2018-03-22 RX ORDER — ONDANSETRON 2 MG/ML
4 INJECTION INTRAMUSCULAR; INTRAVENOUS DAILY PRN
Status: DISCONTINUED | OUTPATIENT
Start: 2018-03-22 | End: 2018-03-22 | Stop reason: HOSPADM

## 2018-03-22 RX ORDER — ROCURONIUM BROMIDE 10 MG/ML
INJECTION, SOLUTION INTRAVENOUS
Status: DISCONTINUED | OUTPATIENT
Start: 2018-03-22 | End: 2018-03-22

## 2018-03-22 RX ORDER — SODIUM CHLORIDE 0.9 % (FLUSH) 0.9 %
3 SYRINGE (ML) INJECTION
Status: DISCONTINUED | OUTPATIENT
Start: 2018-03-22 | End: 2018-03-23 | Stop reason: HOSPADM

## 2018-03-22 RX ORDER — OXYCODONE HYDROCHLORIDE 5 MG/1
5 TABLET ORAL
Status: DISCONTINUED | OUTPATIENT
Start: 2018-03-22 | End: 2018-03-22 | Stop reason: HOSPADM

## 2018-03-22 RX ORDER — OXYCODONE HYDROCHLORIDE 5 MG/1
5 TABLET ORAL
Status: DISCONTINUED | OUTPATIENT
Start: 2018-03-22 | End: 2018-03-23 | Stop reason: HOSPADM

## 2018-03-22 RX ORDER — SODIUM CHLORIDE, SODIUM LACTATE, POTASSIUM CHLORIDE, CALCIUM CHLORIDE 600; 310; 30; 20 MG/100ML; MG/100ML; MG/100ML; MG/100ML
INJECTION, SOLUTION INTRAVENOUS CONTINUOUS
Status: DISCONTINUED | OUTPATIENT
Start: 2018-03-22 | End: 2018-03-22

## 2018-03-22 RX ORDER — DIPHENHYDRAMINE HYDROCHLORIDE 50 MG/ML
25 INJECTION INTRAMUSCULAR; INTRAVENOUS EVERY 6 HOURS PRN
Status: DISCONTINUED | OUTPATIENT
Start: 2018-03-22 | End: 2018-03-22 | Stop reason: HOSPADM

## 2018-03-22 RX ORDER — OXYCODONE HYDROCHLORIDE 5 MG/1
10 TABLET ORAL
Status: DISCONTINUED | OUTPATIENT
Start: 2018-03-22 | End: 2018-03-23 | Stop reason: HOSPADM

## 2018-03-22 RX ORDER — TRANEXAMIC ACID 100 MG/ML
1000 INJECTION, SOLUTION INTRAVENOUS
Status: COMPLETED | OUTPATIENT
Start: 2018-03-22 | End: 2018-03-22

## 2018-03-22 RX ORDER — MIDAZOLAM HYDROCHLORIDE 5 MG/ML
4 INJECTION INTRAMUSCULAR; INTRAVENOUS ONCE AS NEEDED
Status: DISCONTINUED | OUTPATIENT
Start: 2018-03-22 | End: 2018-03-22

## 2018-03-22 RX ORDER — PREGABALIN 75 MG/1
75 CAPSULE ORAL ONCE
Status: COMPLETED | OUTPATIENT
Start: 2018-03-22 | End: 2018-03-22

## 2018-03-22 RX ORDER — SCOLOPAMINE TRANSDERMAL SYSTEM 1 MG/1
PATCH, EXTENDED RELEASE TRANSDERMAL
Status: DISCONTINUED | OUTPATIENT
Start: 2018-03-22 | End: 2018-03-22

## 2018-03-22 RX ORDER — TAMSULOSIN HYDROCHLORIDE 0.4 MG/1
0.4 CAPSULE ORAL DAILY
Status: DISCONTINUED | OUTPATIENT
Start: 2018-03-22 | End: 2018-03-23 | Stop reason: HOSPADM

## 2018-03-22 RX ORDER — OXYCODONE HCL 10 MG/1
10 TABLET, FILM COATED, EXTENDED RELEASE ORAL EVERY 12 HOURS
Status: DISCONTINUED | OUTPATIENT
Start: 2018-03-22 | End: 2018-03-23 | Stop reason: HOSPADM

## 2018-03-22 RX ORDER — ONDANSETRON 2 MG/ML
INJECTION INTRAMUSCULAR; INTRAVENOUS
Status: DISCONTINUED | OUTPATIENT
Start: 2018-03-22 | End: 2018-03-22

## 2018-03-22 RX ORDER — PROMETHAZINE HYDROCHLORIDE 25 MG/1
25 TABLET ORAL EVERY 4 HOURS PRN
Status: DISCONTINUED | OUTPATIENT
Start: 2018-03-22 | End: 2018-03-23 | Stop reason: HOSPADM

## 2018-03-22 RX ORDER — MUPIROCIN 20 MG/G
1 OINTMENT TOPICAL 2 TIMES DAILY
Status: DISCONTINUED | OUTPATIENT
Start: 2018-03-22 | End: 2018-03-23 | Stop reason: HOSPADM

## 2018-03-22 RX ORDER — LIDOCAINE HCL/PF 100 MG/5ML
SYRINGE (ML) INTRAVENOUS
Status: DISCONTINUED | OUTPATIENT
Start: 2018-03-22 | End: 2018-03-22

## 2018-03-22 RX ORDER — ONDANSETRON 2 MG/ML
4 INJECTION INTRAMUSCULAR; INTRAVENOUS EVERY 6 HOURS PRN
Status: DISCONTINUED | OUTPATIENT
Start: 2018-03-22 | End: 2018-03-23 | Stop reason: HOSPADM

## 2018-03-22 RX ORDER — CEFAZOLIN SODIUM 2 G/50ML
2 SOLUTION INTRAVENOUS
Status: COMPLETED | OUTPATIENT
Start: 2018-03-22 | End: 2018-03-23

## 2018-03-22 RX ADMIN — TRANEXAMIC ACID 1 G: 100 INJECTION, SOLUTION INTRAVENOUS at 09:03

## 2018-03-22 RX ADMIN — HYDROMORPHONE HYDROCHLORIDE 0.4 MG: 2 INJECTION, SOLUTION INTRAMUSCULAR; INTRAVENOUS; SUBCUTANEOUS at 12:03

## 2018-03-22 RX ADMIN — MIDAZOLAM HYDROCHLORIDE 3 MG: 1 INJECTION, SOLUTION INTRAMUSCULAR; INTRAVENOUS at 08:03

## 2018-03-22 RX ADMIN — BUPIVACAINE 266 MG: 13.3 INJECTION, SUSPENSION, LIPOSOMAL INFILTRATION at 10:03

## 2018-03-22 RX ADMIN — DEXAMETHASONE SODIUM PHOSPHATE 8 MG: 4 INJECTION, SOLUTION INTRAMUSCULAR; INTRAVENOUS at 09:03

## 2018-03-22 RX ADMIN — VANCOMYCIN HYDROCHLORIDE 1500 MG: 1 INJECTION, POWDER, LYOPHILIZED, FOR SOLUTION INTRAVENOUS at 07:03

## 2018-03-22 RX ADMIN — GLYCOPYRROLATE 0.2 MG: 0.2 INJECTION, SOLUTION INTRAMUSCULAR; INTRAVENOUS at 09:03

## 2018-03-22 RX ADMIN — FENTANYL CITRATE 100 MCG: 50 INJECTION, SOLUTION INTRAMUSCULAR; INTRAVENOUS at 09:03

## 2018-03-22 RX ADMIN — PROPOFOL 100 MG: 10 INJECTION, EMULSION INTRAVENOUS at 09:03

## 2018-03-22 RX ADMIN — MUPIROCIN: 20 OINTMENT TOPICAL at 07:03

## 2018-03-22 RX ADMIN — FENTANYL CITRATE 50 MCG: 50 INJECTION, SOLUTION INTRAMUSCULAR; INTRAVENOUS at 10:03

## 2018-03-22 RX ADMIN — LIDOCAINE HYDROCHLORIDE 50 MG: 20 INJECTION, SOLUTION INTRAVENOUS at 09:03

## 2018-03-22 RX ADMIN — CEFAZOLIN SODIUM 2 G: 2 SOLUTION INTRAVENOUS at 06:03

## 2018-03-22 RX ADMIN — MUPIROCIN 1 G: 20 OINTMENT TOPICAL at 08:03

## 2018-03-22 RX ADMIN — ROPIVACAINE HYDROCHLORIDE: 5 INJECTION, SOLUTION EPIDURAL; INFILTRATION; PERINEURAL at 09:03

## 2018-03-22 RX ADMIN — ATORVASTATIN CALCIUM 20 MG: 20 TABLET, FILM COATED ORAL at 03:03

## 2018-03-22 RX ADMIN — FENTANYL CITRATE 100 MCG: 50 INJECTION, SOLUTION INTRAMUSCULAR; INTRAVENOUS at 08:03

## 2018-03-22 RX ADMIN — OXYCODONE HYDROCHLORIDE 10 MG: 10 TABLET, FILM COATED, EXTENDED RELEASE ORAL at 08:03

## 2018-03-22 RX ADMIN — OXYCODONE HYDROCHLORIDE 5 MG: 5 TABLET ORAL at 11:03

## 2018-03-22 RX ADMIN — NEOSTIGMINE METHYLSULFATE 5 MG: 1 INJECTION INTRAVENOUS at 10:03

## 2018-03-22 RX ADMIN — SODIUM CHLORIDE, SODIUM LACTATE, POTASSIUM CHLORIDE, AND CALCIUM CHLORIDE: 600; 310; 30; 20 INJECTION, SOLUTION INTRAVENOUS at 07:03

## 2018-03-22 RX ADMIN — ONDANSETRON 4 MG: 2 INJECTION INTRAMUSCULAR; INTRAVENOUS at 09:03

## 2018-03-22 RX ADMIN — TRANEXAMIC ACID 1000 MG: 100 INJECTION, SOLUTION INTRAVENOUS at 08:03

## 2018-03-22 RX ADMIN — FENTANYL CITRATE 50 MCG: 50 INJECTION, SOLUTION INTRAMUSCULAR; INTRAVENOUS at 09:03

## 2018-03-22 RX ADMIN — ROCURONIUM BROMIDE 40 MG: 10 INJECTION, SOLUTION INTRAVENOUS at 09:03

## 2018-03-22 RX ADMIN — SCOPALAMINE 1 PATCH: 1 PATCH, EXTENDED RELEASE TRANSDERMAL at 09:03

## 2018-03-22 RX ADMIN — SODIUM CHLORIDE, SODIUM LACTATE, POTASSIUM CHLORIDE, AND CALCIUM CHLORIDE: 600; 310; 30; 20 INJECTION, SOLUTION INTRAVENOUS at 11:03

## 2018-03-22 RX ADMIN — PREGABALIN 75 MG: 75 CAPSULE ORAL at 07:03

## 2018-03-22 RX ADMIN — ONDANSETRON 4 MG: 2 INJECTION INTRAMUSCULAR; INTRAVENOUS at 03:03

## 2018-03-22 RX ADMIN — TAMSULOSIN HYDROCHLORIDE 0.4 MG: 0.4 CAPSULE ORAL at 03:03

## 2018-03-22 RX ADMIN — GLYCOPYRROLATE 0.2 MG: 0.2 INJECTION, SOLUTION INTRAMUSCULAR; INTRAVENOUS at 11:03

## 2018-03-22 RX ADMIN — OXYCODONE HYDROCHLORIDE 5 MG: 5 TABLET ORAL at 12:03

## 2018-03-22 RX ADMIN — SODIUM CHLORIDE, SODIUM LACTATE, POTASSIUM CHLORIDE, AND CALCIUM CHLORIDE: 600; 310; 30; 20 INJECTION, SOLUTION INTRAVENOUS at 09:03

## 2018-03-22 RX ADMIN — GLYCOPYRROLATE 0.8 MG: 0.2 INJECTION, SOLUTION INTRAMUSCULAR; INTRAVENOUS at 10:03

## 2018-03-22 RX ADMIN — ROPIVACAINE HYDROCHLORIDE 30 ML: 5 INJECTION, SOLUTION EPIDURAL; INFILTRATION; PERINEURAL at 08:03

## 2018-03-22 RX ADMIN — CEFAZOLIN 2 G: 330 INJECTION, POWDER, FOR SOLUTION INTRAMUSCULAR; INTRAVENOUS at 09:03

## 2018-03-22 NOTE — ANESTHESIA PROCEDURE NOTES
L ACB    Patient location during procedure: holding area   Block not for primary anesthetic.  Reason for block: at surgeon's request and post-op pain management   Post-op Pain Location: l knee pain  Start time: 3/22/2018 8:38 AM  Timeout: 3/22/2018 8:34 AM   End time: 3/22/2018 8:44 AM  Staffing  Anesthesiologist: JESSICA CLEANING  Performed: anesthesiologist   Preanesthetic Checklist  Completed: patient identified, site marked, surgical consent, pre-op evaluation, timeout performed, IV checked, risks and benefits discussed and monitors and equipment checked  Peripheral Block  Patient position: supine  Prep: ChloraPrep and site prepped and draped  Patient monitoring: heart rate and continuous pulse ox  Block type: adductor canal  Laterality: left  Injection technique: single shot  Needle  Needle type: Echogenic   Needle gauge: 21 G  Needle length: 4 in  Needle localization: anatomical landmarks and ultrasound guidance   -ultrasound image captured on disc.  Assessment  Injection assessment: negative aspiration, negative parasthesia and local visualized surrounding nerve  Paresthesia pain: none  Heart rate change: no  Slow fractionated injection: yes  Medications:  Bolus administered: 30 mL of 0.5 ropivacaine  Epinephrine added: none

## 2018-03-22 NOTE — PLAN OF CARE
Problem: Physical Therapy Goal  Goal: Physical Therapy Goal  Goals to be met by 3-26-18  1. Sup<>sit mod I  2. Sit<>stand with RW mod I  3. amb 150' with RW mod I  4. Up/down 4' curb step with RW mod I    -    Comments: Physical therapy eval completed.  Recommend home with  PT.  No equipment needed.

## 2018-03-22 NOTE — ASSESSMENT & PLAN NOTE
- s/p left TKA 3/22/2018  - ortho recs  - PT/OT  - DVT ppx per Ortho; As patient is post op and a potential bleeding risk, will defer to primary ortho team. Discussed with patient leg muscle flexion every hour to prevent complications.

## 2018-03-22 NOTE — PLAN OF CARE
Problem: Physical Therapy Goal  Goal: Physical Therapy Goal  Goals to be met by 3-26-18  1. Sup<>sit mod I  2. Sit<>stand with RW mod I  3. amb 150' with RW mod I  4. Up/down 4' curb step with RW mod I     -    Comments: Pt still limited by residual anesthesia affect.  Felt dizzy and weak with ambulation.

## 2018-03-22 NOTE — H&P (VIEW-ONLY)
"Sanjeev Gispon  is here for a completion of his perioperative paperwork. he  Is scheduled to undergo 1. Left total knee arthroplasty (Conformis) on 3/22/2018.      He  does need clearance for this procedure. Adrian Haskins MD states, Mr. Gipson appears to be stable and is okay to proceed with his surgery with acceptable cardiovascular risk".    Risks, indications and benefits of the surgical procedure were discussed with the patient. All questions with regard to surgery, rehab, expected return to functional activities, activities of daily living and recreational endeavors were answered to his satisfaction.    Patient was informed and understands the risks of surgery are greater for patients with a current condition or history of heart disease, obesity, clotting disorders, recurrent infections, steroid use, current or past smoking, and factors such as sedentary lifestyle and noncompliance with medications, therapy or follow-up. The degree of the increased risk is hard to estimate with any degree of precision.    Once no other questions were asked, a brief history and physical exam was then performed.    PAST MEDICAL HISTORY: History reviewed. No pertinent past medical history.  PAST SURGICAL HISTORY: History reviewed. No pertinent surgical history.  FAMILY HISTORY: History reviewed. No pertinent family history.  SOCIAL HISTORY:   Social History     Social History    Marital status:      Spouse name: N/A    Number of children: N/A    Years of education: N/A     Occupational History    Not on file.     Social History Main Topics    Smoking status: Not on file    Smokeless tobacco: Not on file    Alcohol use Not on file    Drug use: Unknown    Sexual activity: Not on file     Other Topics Concern    Not on file     Social History Narrative    No narrative on file       MEDICATIONS:   Current Outpatient Prescriptions:     amLODIPine (NORVASC) 10 MG tablet, Take 10 mg by mouth once daily., Disp: " , Rfl:     atorvastatin (LIPITOR) 20 MG tablet, Take 20 mg by mouth once daily., Disp: , Rfl:     ibuprofen (ADVIL,MOTRIN) 800 MG tablet, 800 mg every 6 (six) hours as needed for Pain., Disp: , Rfl:     levothyroxine (SYNTHROID) 50 MCG tablet, Take by mouth once daily., Disp: , Rfl:     lisinopril 10 MG tablet, Take 10 mg by mouth once daily., Disp: , Rfl:     omeprazole (PRILOSEC) 20 MG capsule, Take 20 mg by mouth as needed., Disp: , Rfl:     tamsulosin (FLOMAX) 0.4 mg Cp24, Take 0.4 mg by mouth once daily., Disp: , Rfl:     ASPIRIN ORAL, Take 81 mg by mouth., Disp: , Rfl:     cholecalciferol, vitamin D3, (VITAMIN D3) 5,000 unit Tab, Take 5,000 Units by mouth once daily., Disp: , Rfl:     coenzyme Q10 200 mg capsule, Take 200 mg by mouth once daily., Disp: , Rfl:     omega-3s-dha-epa-fish oil 720-1,200 mg Cap, Take by mouth., Disp: , Rfl:   ALLERGIES: Review of patient's allergies indicates:  No Known Allergies    Review of Systems   Constitution: Negative. Negative for chills, fever and night sweats.   HENT: Negative for congestion and headaches.    Eyes: Negative for blurred vision, left vision loss and right vision loss.   Cardiovascular: Negative for chest pain and syncope.   Respiratory: Negative for cough and shortness of breath.    Endocrine: Negative for polydipsia, polyphagia and polyuria.   Hematologic/Lymphatic: Negative for bleeding problem. Does not bruise/bleed easily.   Skin: Negative for dry skin, itching and rash.   Musculoskeletal: Negative for falls and muscle weakness.   Gastrointestinal: Negative for abdominal pain and bowel incontinence.   Genitourinary: Negative for bladder incontinence and nocturia.   Neurological: Negative for disturbances in coordination, loss of balance and seizures.   Psychiatric/Behavioral: Negative for depression. The patient does not have insomnia.    Allergic/Immunologic: Negative for hives and persistent infections.     PHYSICAL EXAM:  GEN: A&Ox3, WD WN  NAD  HEENT: WNL  CHEST: CTAB, no W/R/R  HEART: RRR, no M/R/G   ABD: Soft, NT ND, BS x4 QUADS  MS: Refer to previous note for detailed MS exam  NEURO: CN II-XII intact       The surgical consent was then reviewed with the patient, who agreed with all the contents of the consent form and it was signed. he was then given the [facility] surgery packet to bring with him to [facility] for the anesthesia portion of his perioperative paperwork.     PHYSICAL THERAPY:  He was also instructed regarding physical therapy and will begin POD # 1-3. He was given a copy of the original prescription to schedule. Another copy of this prescription was also faxed to [facility].    POST OP CARE:instructions were reviewed including care of the wound and dressing after surgery and when he can shower.     PAIN MANAGEMENT: Sanjeev Gipson was also given a pain management regime, which includes the TENS unit given to him by Brennon Barrett along with the education required for its use. He was also instructed regarding the Polar ice unit that will be in place after surgery and his postoperative pain medications.     PAIN MEDICATION:  Percocet 10/325mg 1 po q 4-6 hours prn pain  Ultram 50 mg one p.o. q.4-6 hours p.r.n. breakthrough pain,   Phenergan 25 mg one p.o. q.4-6 hours p.r.n. nausea and vomiting.  Celebrex 200 mg BID    Patient denies history of seizures.     Patient was instructed to buy and take:  Aspirin 325mg daily x 6 weeks for DVT prophylaxis starting on the evening after surgery.  Patient will also use bilateral TEDs on lower extremities, SCDs during surgery, and early ambulation post-op. If the patient was previously taking 81mg baby aspirin, they were told to not take it will using the above stated aspirin and to restart the 81mg aspirin after completion of the aspirin dose.       Patient was also told to buy over the counter Prilosec medication and take it once daily for GI protection as long as they are taking NSAIDs or  Aspirin.    DVT prophylaxis was discussed with the patient today including risk factors for developing DVTs and history of DVTs. The patient was asked if any specific recommendations were given from the doctor/s that did pre-operative surgical clearance.      The patient was told that narcotic pain medications may make them drowsy and instructions were given to not sign legal documents, drive or operate heavy machinery, cars, or equipment while under the influence of narcotic medications.     As there were no other questions to be asked, he was given my business card along with Jennifer David MD business card if he has any questions or concerns prior to surgery or in the postop period.

## 2018-03-22 NOTE — PT/OT/SLP EVAL
Physical Therapy Evaluation    Patient Name:  Sanjeev Gipson   MRN:  [id number]    Recommendations:     Discharge Recommendations:  home, home health PT   Discharge Equipment Recommendations: none   Barriers to discharge: None    Assessment:     Sanjeev Gipson is a 72 y.o. male admitted with a medical diagnosis of <principal problem not specified>.   Pre-op Diagnosis:  Primary localized osteoarthrosis of left lower leg [M17.12]   Post-op Diagnosis:  Post-Op Diagnosis Codes:     * Primary localized osteoarthrosis of left lower leg [M17.12]   Procedure: Procedure(s) (LRB):  REPLACEMENT-KNEE-TOTAL (Left)     He presents with the following impairments/functional limitations:  impaired endurance, impaired functional mobilty, gait instability, impaired balance, pain, decreased lower extremity function.  Presents s/p TKR L with impaired mobility and will benefit from hospital PT and  PT to regain full independent functional mobility.    Rehab Prognosis:  excellent; patient would benefit from acute skilled PT services to address these deficits and reach maximum level of function.      Recent Surgery: Procedure(s) (LRB):  REPLACEMENT-KNEE-TOTAL (Left) Day of Surgery    Plan:     During this hospitalization, patient to be seen   to address the above listed problems via gait training, therapeutic activities, therapeutic exercises  · Plan of Care Expires:  03/22/18   Plan of Care Reviewed with: patient, spouse    Subjective     Communicated with patient and spouse prior to session.  Patient found in bed supine upon PT entry to room, agreeable to evaluation.      Chief Complaint: no complaint  Patient comments/goals: pt very lethargic from effects of anesthesia   Pain/Comfort:  · Pain Rating 1: 2/10  · Location - Side 1: Left  · Location 1: knee  · Pain Addressed 1: Pre-medicate for activity, Reposition, Distraction  · Pain Rating Post-Intervention 1: 2/10    Patients cultural, spiritual, Christianity conflicts given the  current situation: no    Living Environment:  Lives with wife in SS house with threshold step to enter.  Shower in stall with SC.  Prior to admission, patients level of function was I without AD.  Patient has the following equipment: walker, rolling, bedside commode, cane, straight.  DME owned (not currently used): rolling walker.  Upon discharge, patient will have assistance from wife.    Objective:     Patient found with: peripheral IV, LYUDMILA drain     General Precautions: Standard,     Orthopedic Precautions:LLE weight bearing as tolerated   Braces: N/A     Exams:  · Cognitive Exam:  Patient is oriented to Person, Place, Time and Situation and follows 80% of verbal commands   · Fine Motor Coordination:    · -       Impaired  LLE heel shin -  · Gross Motor Coordination:  WFL  · Postural Exam:  Patient presented with the following abnormalities:    · -       Rounded shoulders  · -       Forward head  · Sensation:    · -       Intact  · Skin Integrity/Edema:      · -       Skin integrity: Visible skin intact  · -       Edema: Mild L LE  · RLE ROM: WNL  · RLE Strength: WNL  · LLE ROM: WFL except limited by pain  · LLE Strength: WFL    Functional Mobility:  · Bed Mobility:     · Supine to Sit: minimum assistance  · Transfers:     · Sit to Stand:  minimum assistance with rolling walker  · Gait: amb 3' with RW and min A - limited by residual effects of anesthesia  · Balance: poor standing dynamic balance    AM-PAC 6 CLICK MOBILITY  Total Score:18     Patient left up in chair with all lines intact, call button in reach, nurse notified and wife present.    GOALS:    Physical Therapy Goals        Problem: Physical Therapy Goal    Goal Priority Disciplines Outcome Goal Variances Interventions   Physical Therapy Goal     PT/OT, PT      Description:  Goals to be met by 3-26-18  1. Sup<>sit mod I  2. Sit<>stand with RW mod I  3. amb 150' with RW mod I  4. Up/down 4' curb step with RW mod I                      History:     Past  Medical History:   Diagnosis Date    Elevated cholesterol     Hypertension     PONV (postoperative nausea and vomiting)     Thyroid disease        Past Surgical History:   Procedure Laterality Date    CARDIAC CATHETERIZATION      EYE SURGERY Bilateral     Cataracts    JOINT REPLACEMENT Right     knee    ROTATOR CUFF REPAIR Left        Clinical Decision Making:     History  Co-morbidities and personal factors that may impact the plan of care Examination  Body Structures and Functions, activity limitations and participation restrictions that may impact the plan of care Clinical Presentation   Decision Making/ Complexity Score   Co-morbidities:   [] Time since onset of injury / illness / exacerbation  [] Status of current condition  []Patient's cognitive status and safety concerns    [] Multiple Medical Problems (see med hx)  Personal Factors:   [] Patient's age  [] Prior Level of function   [] Patient's home situation (environment and family support)  [] Patient's level of motivation  [] Expected progression of patient      HISTORY:(criteria)    [] 41386 - no personal factors/history    [] 89908 - has 1-2 personal factor/comorbidity     [] 44972 - has >3 personal factor/comorbidity     Body Regions:  [] Objective examination findings  [] Head     []  Neck  [] Trunk   [] Upper Extremity  [] Lower Extremity    Body Systems:  [] For communication ability, affect, cognition, language, and learning style: the assessment of the ability to make needs known, consciousness, orientation (person, place, and time), expected emotional /behavioral responses, and learning preferences (eg, learning barriers, education  needs)  [] For the neuromuscular system: a general assessment of gross coordinated movement (eg, balance, gait, locomotion, transfers, and transitions) and motor function  (motor control and motor learning)  [] For the musculoskeletal system: the assessment of gross symmetry, gross range of motion, gross  strength, height, and weight  [] For the integumentary system: the assessment of pliability(texture), presence of scar formation, skin color, and skin integrity  [] For cardiovascular/pulmonary system: the assessment of heart rate, respiratory rate, blood pressure, and edema     Activity limitations:    [] Patient's cognitive status and saf ety concerns          [] Status of current condition      [] Weight bearing restriction  [] Cardiopulmunary Restriction    Participation Restrictions:   [] Goals and goal agreement with the patient     [] Rehab potential (prognosis) and probable outcome      Examination of Body System: (criteria)    [] 65936 - addressing 1-2 elements    [] 94722 - addressing a total of 3 or more elements     [] 54653 -  Addressing a total of 4 or more elements         Clinical Presentation: (criteria)  Choose one     On examination of body system using standardized tests and measures patient presents with (CHOOSE ONE) elements from any of the following: body structures and functions, activity limitations, and/or participation restrictions.  Leading to a clinical presentation that is considered (CHOOSE ONE)                              Clinical Decision Making  (Eval Complexity):  Choose One     Time Tracking:     PT Received On: 03/22/18  PT Start Time: 1328     PT Stop Time: 1406  PT Total Time (min): 38 min     Billable Minutes: Evaluation 23 and Therapeutic Activity 15      Adrian Loredo, JANKI  03/22/2018

## 2018-03-22 NOTE — HPI
"73 y/o male with hx HTN, Hypothyroidism, Primary localized osteoarthrosis of left lower leg admitted after elective left total knee replacement. Currently c/o "dizziness". States he has this after anesthesia. Denies N/V, no CP or SOB. Past Hx of smoking, drinks a glass of wine a night. Vitals stable.    Consulted for medical management  "

## 2018-03-22 NOTE — CONSULTS
"[facility] Hospital Medicine  Consult Note    Patient Name: Sanjeev Gipson  MRN: [id number]  Admission Date: 3/22/2018  Hospital Length of Stay: 1 days  Attending Physician: Jennifer David MD   Primary Care Provider: Adrian Haskins MD           Patient information was obtained from patient, spouse/SO and past medical records.     Consults  Subjective:     Principal Problem: Primary osteoarthritis of left knee    Chief Complaint: No chief complaint on file.       HPI: 71 y/o male with hx HTN, Hypothyroidism, Primary localized osteoarthrosis of left lower leg admitted after elective left total knee replacement. Currently c/o "dizziness". States he has this after anesthesia. Denies N/V, no CP or SOB. Past Hx of smoking, drinks a glass of wine a night. Vitals stable.    Consulted for medical management    Patient Name: Sanjeev Gipson  MRN: [id number]  Admission Date: 3/22/2018  Attending Physician: Jennifer David MD   Primary Care Provider: Adrian Haskins MD           Patient information was obtained from patient, spouse/SO and past medical records.      Subjective:      Principal Problem:Primary osteoarthritis of left knee     Chief Complaint: No chief complaint on file.        HPI: 71 y/o male with hx HTN, Hypothyroidism, Primary localized osteoarthrosis of left lower leg admitted after elective left total knee replacement. Currently c/o "dizziness". States he has this after anesthesia. Denies N/V, no CP or SOB. Past Hx of smoking, drinks a glass of wine a night. Vitals stable.     Consulted for medical management          Past Medical History:   Diagnosis Date    Elevated cholesterol      Hypertension      PONV (postoperative nausea and vomiting)      Thyroid disease                 Past Surgical History:   Procedure Laterality Date    CARDIAC CATHETERIZATION        EYE SURGERY Bilateral       Cataracts    JOINT REPLACEMENT Right       knee    ROTATOR CUFF REPAIR Left   "         Review of patient's allergies indicates:  No Known Allergies     No current facility-administered medications on file prior to encounter.            Current Outpatient Prescriptions on File Prior to Encounter   Medication Sig    amLODIPine (NORVASC) 10 MG tablet Take 10 mg by mouth once daily.    atorvastatin (LIPITOR) 20 MG tablet Take 20 mg by mouth once daily.    ibuprofen (ADVIL,MOTRIN) 800 MG tablet 800 mg every 6 (six) hours as needed for Pain.    levothyroxine (SYNTHROID) 50 MCG tablet Take by mouth once daily.    lisinopril 10 MG tablet Take 10 mg by mouth once daily.    omeprazole (PRILOSEC) 20 MG capsule Take 20 mg by mouth as needed.    tamsulosin (FLOMAX) 0.4 mg Cp24 Take 0.4 mg by mouth once daily.          Family History      None                 Social History Main Topics    Smoking status: Former Smoker       Packs/day: 2.00       Years: 10.00    Smokeless tobacco: Never Used    Alcohol use Yes         Comment: glass of wine daily    Drug use: No    Sexual activity: Not on file      Review of Systems   Constitutional: Negative for diaphoresis and fever.   HENT: Negative for congestion, sinus pain and sinus pressure.    Eyes: Negative.    Respiratory: Negative for shortness of breath.    Cardiovascular: Negative for chest pain and palpitations.   Gastrointestinal: Negative for abdominal pain, nausea and vomiting.   Musculoskeletal: Positive for arthralgias.   Skin: Negative.    Neurological: Positive for dizziness. Negative for speech difficulty, numbness and headaches.   Psychiatric/Behavioral: Negative for agitation.      Objective:      Vital Signs (Most Recent):  Temp: 97.5 °F (36.4 °C) (03/22/18 1354)  Pulse: 88 (03/22/18 1354)  Resp: 16 (03/22/18 1354)  BP: (!) 121/57 (03/22/18 1354)  SpO2: 98 % (03/22/18 1354) Vital Signs (24h Range):  Temp:  [97.4 °F (36.3 °C)-97.5 °F (36.4 °C)] 97.5 °F (36.4 °C)  Pulse:  [72-93] 88  Resp:  [12-16] 16  SpO2:  [93 %-99 %] 98 %  BP:  (121-151)/(52-71) 121/57      Weight: 89.4 kg (196 lb 15.7 oz)  Body mass index is 29.09 kg/m².     Physical Exam   Constitutional: He is oriented to person, place, and time. He appears well-developed and well-nourished. No distress.   HENT:   Head: Normocephalic and atraumatic.   Mouth/Throat: Oropharynx is clear and moist. No oropharyngeal exudate.   Eyes: Conjunctivae are normal. Pupils are equal, round, and reactive to light. Right eye exhibits no discharge. Left eye exhibits no discharge.   Neck: Normal range of motion. Neck supple.   Cardiovascular: Normal rate, regular rhythm, normal heart sounds and intact distal pulses.    Pulmonary/Chest: Effort normal and breath sounds normal. No respiratory distress. He has no wheezes.   Abdominal: Soft. Bowel sounds are normal. He exhibits no distension. There is no tenderness.   Musculoskeletal:   Left LE dressed   Neurological: He is alert and oriented to person, place, and time. No cranial nerve deficit.   Skin: Skin is warm and dry. Capillary refill takes less than 2 seconds. He is not diaphoretic.   Psychiatric: He has a normal mood and affect.   Nursing note and vitals reviewed.           CRANIAL NERVES      CN III, IV, VI   Pupils are equal, round, and reactive to light.        Significant Labs:   CBC:   Recent Labs  Lab 03/22/18  1207   HGB 14.8   HCT 44.4      All pertinent labs within the past 24 hours have been reviewed.     Significant Imaging: I have reviewed all pertinent imaging results/findings within the past 24 hours.       Assessment/Plan:     * Primary osteoarthritis of left knee    - s/p left TKA 3/22/2018  - ortho recs  - PT/OT  - DVT ppx per Ortho; As patient is post op and a potential bleeding risk, will defer to primary ortho team. Discussed with patient leg muscle flexion every hour to prevent complications.             Other specified hypothyroidism    - cont synthroid          Essential hypertension    - currently normotensive  - will hold BP  meds today d/t dizziness, BP on lower side  - monitor            VTE Risk Mitigation     None              Thank you for your consult. I will follow-up with patient. Please contact us if you have any additional questions.    Zofia Manzo PA-C  Department of Hospital Medicine   [facility]

## 2018-03-22 NOTE — ASSESSMENT & PLAN NOTE
- currently normotensive  - will hold BP meds d/t dizziness, BP on lower side, discussed with patient to hold BP meds for 2 days, check BP at home, follow up with PCP  - monitor

## 2018-03-22 NOTE — NURSING
Pt c/o of having urge to void and being unable to void. Attempted several times and is unable to void. Bladder scanned c >300cc urine noted. In and out catheter done and 750cc urine obtained. Pt states he has relief.

## 2018-03-22 NOTE — PLAN OF CARE
Problem: Patient Care Overview  Goal: Plan of Care Review  Outcome: Ongoing (interventions implemented as appropriate)  Patient in no apparent distress. Sat's 93  % on room air . Will continue to monitor.

## 2018-03-22 NOTE — H&P
"[facility] Hospital Medicine  History & Physical    Patient Name: Sanjeev Gipson  MRN: [id number]  Admission Date: 3/22/2018  Attending Physician: Jennifer David MD   Primary Care Provider: Adrian Haskins MD         Patient information was obtained from patient, spouse/SO and past medical records.     Subjective:     Principal Problem:Primary osteoarthritis of left knee    Chief Complaint: No chief complaint on file.       HPI: 71 y/o male with hx HTN, Hypothyroidism, Primary localized osteoarthrosis of left lower leg admitted after elective left total knee replacement. Currently c/o "dizziness". States he has this after anesthesia. Denies N/V, no CP or SOB. Past Hx of smoking, drinks a glass of wine a night. Vitals stable.    Consulted for medical management    Past Medical History:   Diagnosis Date    Elevated cholesterol     Hypertension     PONV (postoperative nausea and vomiting)     Thyroid disease        Past Surgical History:   Procedure Laterality Date    CARDIAC CATHETERIZATION      EYE SURGERY Bilateral     Cataracts    JOINT REPLACEMENT Right     knee    ROTATOR CUFF REPAIR Left        Review of patient's allergies indicates:  No Known Allergies    No current facility-administered medications on file prior to encounter.      Current Outpatient Prescriptions on File Prior to Encounter   Medication Sig    amLODIPine (NORVASC) 10 MG tablet Take 10 mg by mouth once daily.    atorvastatin (LIPITOR) 20 MG tablet Take 20 mg by mouth once daily.    ibuprofen (ADVIL,MOTRIN) 800 MG tablet 800 mg every 6 (six) hours as needed for Pain.    levothyroxine (SYNTHROID) 50 MCG tablet Take by mouth once daily.    lisinopril 10 MG tablet Take 10 mg by mouth once daily.    omeprazole (PRILOSEC) 20 MG capsule Take 20 mg by mouth as needed.    tamsulosin (FLOMAX) 0.4 mg Cp24 Take 0.4 mg by mouth once daily.     Family History     None        Social History Main Topics    Smoking " status: Former Smoker     Packs/day: 2.00     Years: 10.00    Smokeless tobacco: Never Used    Alcohol use Yes      Comment: glass of wine daily    Drug use: No    Sexual activity: Not on file     Review of Systems   Constitutional: Negative for diaphoresis and fever.   HENT: Negative for congestion, sinus pain and sinus pressure.    Eyes: Negative.    Respiratory: Negative for shortness of breath.    Cardiovascular: Negative for chest pain and palpitations.   Gastrointestinal: Negative for abdominal pain, nausea and vomiting.   Musculoskeletal: Positive for arthralgias.   Skin: Negative.    Neurological: Positive for dizziness. Negative for speech difficulty, numbness and headaches.   Psychiatric/Behavioral: Negative for agitation.     Objective:     Vital Signs (Most Recent):  Temp: 97.5 °F (36.4 °C) (03/22/18 1354)  Pulse: 88 (03/22/18 1354)  Resp: 16 (03/22/18 1354)  BP: (!) 121/57 (03/22/18 1354)  SpO2: 98 % (03/22/18 1354) Vital Signs (24h Range):  Temp:  [97.4 °F (36.3 °C)-97.5 °F (36.4 °C)] 97.5 °F (36.4 °C)  Pulse:  [72-93] 88  Resp:  [12-16] 16  SpO2:  [93 %-99 %] 98 %  BP: (121-151)/(52-71) 121/57     Weight: 89.4 kg (196 lb 15.7 oz)  Body mass index is 29.09 kg/m².    Physical Exam   Constitutional: He is oriented to person, place, and time. He appears well-developed and well-nourished. No distress.   HENT:   Head: Normocephalic and atraumatic.   Mouth/Throat: Oropharynx is clear and moist. No oropharyngeal exudate.   Eyes: Conjunctivae are normal. Pupils are equal, round, and reactive to light. Right eye exhibits no discharge. Left eye exhibits no discharge.   Neck: Normal range of motion. Neck supple.   Cardiovascular: Normal rate, regular rhythm, normal heart sounds and intact distal pulses.    Pulmonary/Chest: Effort normal and breath sounds normal. No respiratory distress. He has no wheezes.   Abdominal: Soft. Bowel sounds are normal. He exhibits no distension. There is no tenderness.    Musculoskeletal:   Left LE dressed   Neurological: He is alert and oriented to person, place, and time. No cranial nerve deficit.   Skin: Skin is warm and dry. Capillary refill takes less than 2 seconds. He is not diaphoretic.   Psychiatric: He has a normal mood and affect.   Nursing note and vitals reviewed.        CRANIAL NERVES     CN III, IV, VI   Pupils are equal, round, and reactive to light.       Significant Labs:   CBC:   Recent Labs  Lab 03/22/18  1207   HGB 14.8   HCT 44.4     All pertinent labs within the past 24 hours have been reviewed.    Significant Imaging: I have reviewed all pertinent imaging results/findings within the past 24 hours.           Assessment/Plan:     * Primary osteoarthritis of left knee    - s/p left TKA 3/22/2018  - ortho recs  - PT/OT  - DVT ppx per Ortho; As patient is post op and a potential bleeding risk, will defer to primary ortho team. Discussed with patient leg muscle flexion every hour to prevent complications.             Other specified hypothyroidism    - cont synthroid          Essential hypertension    - currently normotensive  - will hold BP meds today d/t dizziness, BP on lower side  - monitor            VTE Risk Mitigation     None             Zofia Manzo PA-C  Department of Hospital Medicine   [facility]

## 2018-03-22 NOTE — ANESTHESIA POSTPROCEDURE EVALUATION
"Anesthesia Post Evaluation    Patient: Sanjeev Gipson    Procedure(s) Performed: Procedure(s) (LRB):  REPLACEMENT-KNEE-TOTAL (Left)    Final Anesthesia Type: general  Patient location during evaluation: PACU  Patient participation: Yes- Able to Participate  Level of consciousness: awake and alert and oriented  Post-procedure vital signs: reviewed and stable  Pain management: adequate  Airway patency: patent  PONV status at discharge: No PONV  Anesthetic complications: no      Cardiovascular status: blood pressure returned to baseline and hemodynamically stable  Respiratory status: unassisted, spontaneous ventilation and nasal cannula  Hydration status: euvolemic  Follow-up not needed.        Visit Vitals  BP (!) 121/57 (Patient Position: Lying)   Pulse 88   Temp 36.4 °C (97.5 °F) (Oral)   Resp 16   Ht 5' 9" (1.753 m)   Wt 89.4 kg (197 lb)   SpO2 98%   BMI 29.09 kg/m²       Pain/Chetan Score: Pain Assessment Performed: Yes (3/22/2018 12:30 PM)  Presence of Pain: complains of pain/discomfort (3/22/2018 12:30 PM)  Pain Rating Prior to Med Admin: 7 (3/22/2018 12:32 PM)  Pain Rating Post Med Admin: 7 (3/22/2018 12:32 PM)  Chetan Score: 8 (3/22/2018 12:30 PM)      "

## 2018-03-22 NOTE — PT/OT/SLP PROGRESS
Occupational Therapy  Not Seen    Sanjeev Gipson   MRN: [id number]     Patient not seen for Occupational Therapy today due to ( ) departmental protocol for elective surgery patients.    Patient with Primary localized osteoarthrosis of left lower leg [M17.12], s/p Procedure(s):  REPLACEMENT-KNEE-TOTAL 3/22/2018 who will be seen for Occupational Therapy evaluation POD#1.    Jacque Elliott, OT   3/22/2018

## 2018-03-22 NOTE — PLAN OF CARE
Problem: Patient Care Overview  Goal: Plan of Care Review  Plan of care reviewed c pt and spouse. Pt admitted from PACU at 1400. Vs stable. Pt c/o of weakness and lightheadedness when standing up , color pale and pt sitting in chair very sleepy. Arouses easily but groggy. Left knee nakita to dressing noted to be dry and intact c hemovac  drain in place. Good neuro checks to left foot. Iv saline locked. Zofran given 4mg Ivp for potential nausea. BP monitored closely =116/80.  tolerating clear liquids and soup. Pt returned to bed and CPM on left knee. BP improving at 1600. Pt still sleepy but appears comfortable, denies pain at this time.

## 2018-03-22 NOTE — PT/OT/SLP PROGRESS
Physical Therapy Treatment    Patient Name:  Sanjeev Gipson   MRN:  [id number]    Recommendations:     Discharge Recommendations:  home health PT   Discharge Equipment Recommendations: none   Barriers to discharge: None    Assessment:     Sanjeev Gipson is a 72 y.o. male admitted with a medical diagnosis of Primary osteoarthritis of left knee.  He presents with the following impairments/functional limitations:  weakness, impaired endurance, gait instability, impaired balance, decreased lower extremity function. Pt still limited by residual anesthesia affect.  Felt dizzy and weak with ambulation.      Rehab Prognosis:  excellent; patient would benefit from acute skilled PT services to address these deficits and reach maximum level of function.      Recent Surgery: Procedure(s) (LRB):  REPLACEMENT-KNEE-TOTAL (Left) Day of Surgery    Plan:     During this hospitalization, patient to be seen BID to address the above listed problems via gait training, therapeutic activities, therapeutic exercises  · Plan of Care Expires:  03/22/18   Plan of Care Reviewed with: patient, spouse    Subjective     Communicated with patient prior to session.  Patient found in bs chair upon PT entry to room, agreeable to treatment.      Chief Complaint: can't urinate  Patient comments/goals: feels drugged    Pain/Comfort:  · Pain Rating 1: 2/10  · Location - Side 1: Left  · Location 1: knee  · Pain Addressed 1: Pre-medicate for activity, Distraction  · Pain Rating Post-Intervention 1: 2/10    Patients cultural, spiritual, Mandaeism conflicts given the current situation: no    Objective:     Patient found with: peripheral IV, LYUDMILA drain     General Precautions: Standard,     Orthopedic Precautions:LLE weight bearing as tolerated   Braces: N/A     Functional Mobility:  · Bed Mobility:     · Sit to Supine: minimum assistance  · Transfers:     · Sit to Stand:  contact guard assistance with rolling walker  · Gait: amb 20' to BR to urinate  without success then return to bed with C/o dizzyness  · Balance: fair standing dynamic balance      AM-PAC 6 CLICK MOBILITY  Turning over in bed (including adjusting bedclothes, sheets and blankets)?: 3  Sitting down on and standing up from a chair with arms (e.g., wheelchair, bedside commode, etc.): 3  Moving from lying on back to sitting on the side of the bed?: 3  Moving to and from a bed to a chair (including a wheelchair)?: 3  Need to walk in hospital room?: 3  Climbing 3-5 steps with a railing?: 3  Total Score: 18       Therapeutic Activities and Exercises:   in BS chair ap x 20; slr x 20; qs x 7    Patient left with bed in chair position with all lines intact, call button in reach, bed alarm on, nurse notified and wife present..    GOALS:    Physical Therapy Goals        Problem: Physical Therapy Goal    Goal Priority Disciplines Outcome Goal Variances Interventions   Physical Therapy Goal     PT/OT, PT      Description:  Goals to be met by 3-26-18  1. Sup<>sit mod I  2. Sit<>stand with RW mod I  3. amb 150' with RW mod I  4. Up/down 4' curb step with RW mod I                      Time Tracking:     PT Received On: 03/22/18  PT Start Time: 1544     PT Stop Time: 1617  PT Total Time (min): 33 min     Billable Minutes: Gait Training 18 and Therapeutic Exercise 15    Treatment Type: Treatment  PT/PTA: PT           Adrian Loredo, PT  03/22/2018

## 2018-03-22 NOTE — TRANSFER OF CARE
"Anesthesia Transfer of Care Note    Patient: Sanjeev Gipson    Procedure(s) Performed: Procedure(s) (LRB):  REPLACEMENT-KNEE-TOTAL (Left)    Patient location: PACU    Anesthesia Type: general    Transport from OR: Transported from OR on 2-3 L/min O2 by NC with adequate spontaneous ventilation    Post pain: adequate analgesia    Post assessment: no apparent anesthetic complications    Post vital signs: stable    Level of consciousness: awake    Nausea/Vomiting: no nausea/vomiting    Complications: none    Transfer of care protocol was followed      Last vitals:   Visit Vitals  BP (!) 142/68   Pulse 93   Temp 36.4 °C (97.5 °F) (Oral)   Resp 12   Ht 5' 9" (1.753 m)   Wt 89.4 kg (197 lb)   SpO2 98%   BMI 29.09 kg/m²     "

## 2018-03-22 NOTE — BRIEF OP NOTE
[facility]    Brief Operative Note     SUMMARY     Surgery Date: 3/22/2018     Surgeon(s) and Role:     * Jennifer David MD - Primary    Assisting Surgeon: None    Pre-op Diagnosis:  Primary localized osteoarthrosis of left lower leg [M17.12]    Post-op Diagnosis:  Post-Op Diagnosis Codes:     * Primary localized osteoarthrosis of left lower leg [M17.12]    Procedure: Procedure(s) (LRB):  REPLACEMENT-KNEE-TOTAL (Left)    Anesthesia: General    Description of the findings of the procedure: See Dictation    Findings/Key Components: L knee OA    Estimated Blood Loss: * No values recorded between 3/22/2018  9:39 AM and 3/22/2018 10:52 AM *         Specimens:   Specimen (12h ago through future)    None          Disposition: Patient tolerated the procedure well and was transferred to PACU in stable condition.

## 2018-03-22 NOTE — SUBJECTIVE & OBJECTIVE
Past Medical History:   Diagnosis Date    Elevated cholesterol     Hypertension     PONV (postoperative nausea and vomiting)     Thyroid disease        Past Surgical History:   Procedure Laterality Date    CARDIAC CATHETERIZATION      EYE SURGERY Bilateral     Cataracts    JOINT REPLACEMENT Right     knee    ROTATOR CUFF REPAIR Left        Review of patient's allergies indicates:  No Known Allergies    No current facility-administered medications on file prior to encounter.      Current Outpatient Prescriptions on File Prior to Encounter   Medication Sig    amLODIPine (NORVASC) 10 MG tablet Take 10 mg by mouth once daily.    atorvastatin (LIPITOR) 20 MG tablet Take 20 mg by mouth once daily.    ibuprofen (ADVIL,MOTRIN) 800 MG tablet 800 mg every 6 (six) hours as needed for Pain.    levothyroxine (SYNTHROID) 50 MCG tablet Take by mouth once daily.    lisinopril 10 MG tablet Take 10 mg by mouth once daily.    omeprazole (PRILOSEC) 20 MG capsule Take 20 mg by mouth as needed.    tamsulosin (FLOMAX) 0.4 mg Cp24 Take 0.4 mg by mouth once daily.     Family History     None        Social History Main Topics    Smoking status: Former Smoker     Packs/day: 2.00     Years: 10.00    Smokeless tobacco: Never Used    Alcohol use Yes      Comment: glass of wine daily    Drug use: No    Sexual activity: Not on file     Review of Systems   Constitutional: Negative for diaphoresis and fever.   HENT: Negative for congestion, sinus pain and sinus pressure.    Eyes: Negative.    Respiratory: Negative for shortness of breath.    Cardiovascular: Negative for chest pain and palpitations.   Gastrointestinal: Negative for abdominal pain, nausea and vomiting.   Musculoskeletal: Positive for arthralgias.   Skin: Negative.    Neurological: Negative for dizziness, speech difficulty, numbness and headaches.   Psychiatric/Behavioral: Negative for agitation.     Objective:     Vital Signs (Most Recent):  Temp: 97.5 °F (36.4 °C)  (03/22/18 1354)  Pulse: 88 (03/22/18 1354)  Resp: 16 (03/22/18 1354)  BP: (!) 121/57 (03/22/18 1354)  SpO2: 98 % (03/22/18 1354) Vital Signs (24h Range):  Temp:  [97.4 °F (36.3 °C)-97.5 °F (36.4 °C)] 97.5 °F (36.4 °C)  Pulse:  [72-93] 88  Resp:  [12-16] 16  SpO2:  [93 %-99 %] 98 %  BP: (121-151)/(52-71) 121/57     Weight: 89.4 kg (196 lb 15.7 oz)  Body mass index is 29.09 kg/m².    Physical Exam   Constitutional: He is oriented to person, place, and time. He appears well-developed and well-nourished. No distress.   HENT:   Head: Normocephalic and atraumatic.   Mouth/Throat: Oropharynx is clear and moist. No oropharyngeal exudate.   Eyes: Conjunctivae are normal. Pupils are equal, round, and reactive to light. Right eye exhibits no discharge. Left eye exhibits no discharge.   Neck: Normal range of motion. Neck supple.   Cardiovascular: Normal rate, regular rhythm, normal heart sounds and intact distal pulses.    Pulmonary/Chest: Effort normal and breath sounds normal. No respiratory distress. He has no wheezes.   Abdominal: Soft. Bowel sounds are normal. He exhibits no distension. There is no tenderness.   Musculoskeletal:   Left LE dressed   Neurological: He is alert and oriented to person, place, and time. No cranial nerve deficit.   Skin: Skin is warm and dry. Capillary refill takes less than 2 seconds. He is not diaphoretic.   Psychiatric: He has a normal mood and affect.   Nursing note and vitals reviewed.        CRANIAL NERVES     CN III, IV, VI   Pupils are equal, round, and reactive to light.       Significant Labs:   CBC:   Recent Labs  Lab 03/22/18  1207   HGB 14.8   HCT 44.4     All pertinent labs within the past 24 hours have been reviewed.    Significant Imaging: I have reviewed all pertinent imaging results/findings within the past 24 hours.

## 2018-03-23 ENCOUNTER — PATIENT MESSAGE (OUTPATIENT)
Dept: SPORTS MEDICINE | Facility: CLINIC | Age: 73
End: 2018-03-23

## 2018-03-23 VITALS
WEIGHT: 197 LBS | DIASTOLIC BLOOD PRESSURE: 64 MMHG | SYSTOLIC BLOOD PRESSURE: 117 MMHG | HEIGHT: 69 IN | OXYGEN SATURATION: 96 % | TEMPERATURE: 98 F | BODY MASS INDEX: 29.18 KG/M2 | HEART RATE: 82 BPM | RESPIRATION RATE: 16 BRPM

## 2018-03-23 PROBLEM — R33.9 URINARY RETENTION: Status: ACTIVE | Noted: 2018-03-23

## 2018-03-23 LAB
HCT VFR BLD AUTO: 36.9 %
HGB BLD-MCNC: 12.6 G/DL

## 2018-03-23 PROCEDURE — 36415 COLL VENOUS BLD VENIPUNCTURE: CPT

## 2018-03-23 PROCEDURE — 97535 SELF CARE MNGMENT TRAINING: CPT

## 2018-03-23 PROCEDURE — 85018 HEMOGLOBIN: CPT

## 2018-03-23 PROCEDURE — 85014 HEMATOCRIT: CPT

## 2018-03-23 PROCEDURE — 25000003 PHARM REV CODE 250: Performed by: ORTHOPAEDIC SURGERY

## 2018-03-23 PROCEDURE — 63600175 PHARM REV CODE 636 W HCPCS: Performed by: ORTHOPAEDIC SURGERY

## 2018-03-23 PROCEDURE — 97116 GAIT TRAINING THERAPY: CPT

## 2018-03-23 PROCEDURE — 97165 OT EVAL LOW COMPLEX 30 MIN: CPT

## 2018-03-23 PROCEDURE — 97110 THERAPEUTIC EXERCISES: CPT

## 2018-03-23 RX ADMIN — ATORVASTATIN CALCIUM 20 MG: 20 TABLET, FILM COATED ORAL at 09:03

## 2018-03-23 RX ADMIN — OXYCODONE HYDROCHLORIDE 5 MG: 5 TABLET ORAL at 05:03

## 2018-03-23 RX ADMIN — MUPIROCIN 1 G: 20 OINTMENT TOPICAL at 09:03

## 2018-03-23 RX ADMIN — OXYCODONE HYDROCHLORIDE 10 MG: 10 TABLET, FILM COATED, EXTENDED RELEASE ORAL at 09:03

## 2018-03-23 RX ADMIN — CEFAZOLIN SODIUM 2 G: 2 SOLUTION INTRAVENOUS at 01:03

## 2018-03-23 RX ADMIN — OXYCODONE HYDROCHLORIDE 5 MG: 5 TABLET ORAL at 12:03

## 2018-03-23 RX ADMIN — LEVOTHYROXINE SODIUM 50 MCG: 50 TABLET ORAL at 05:03

## 2018-03-23 RX ADMIN — TAMSULOSIN HYDROCHLORIDE 0.4 MG: 0.4 CAPSULE ORAL at 09:03

## 2018-03-23 NOTE — DISCHARGE INSTRUCTIONS
[address], [place]                                                                                          [phone]                   Postoperative Instructions for Knee Surgery                 Your Surgery Included:   Open               Arthroscopic   [] Ligament Repair       [] Diagnostic           [] ACL     [] PCL     [] MCL     [] PLLC      [] Synovectomy / Plica Removal [] Meniscal Cartilage Repair / Transplantation      [] Lysis of Adhesions / Manipulation [] Articular Cartilage Repair      [] Interval Release           [] Microfracture       [] OATS   [] ACI      [] Meniscectomy           [] Osteochondral Allograft      [] Meniscal Cartilage Repair  [] Patellar Realignment       [] Debridement / Chondroplasty         [] Lateral Release   [] Ligament Repair      [] Articular Cartilage Repair          [] Extensor Mechanism             []   Microfracture  []  OATS         []  Cartilage Biopsy [] Tendon Repair          [] Ligament Reconstruction          [] Patella                  [] Quadriceps             []   ACL    []   PCL  [] High Tibial Osteotomy       [] PRP Arthrocentesis  [] Joint Replacement         [] Amniox Arthrocentesis           [] Unicompartmental   [] Patellofemoral                    [x] Total Knee                  Call our office ([phone]) immediately if you experience any of the following:       Excessive bleeding or pus like drainage at the incision site       Uncontrollable pain not relieved by pain medication       Excessive swelling or redness at the incision site       Fever above 101.5 degrees not controlled with Tylenol or Motrin       Shortness of Breath       Any foul odor or blistering from the surgery site    FOR EMERGENCIES: If any unusual problems or difficulties occur, call our office at [phone], or page the  at [phone] who will direct your call appropriately    1.   Pain Management: A cold therapy cuff,  pain medications, local injections, and in some cases, regional anesthesia injections are used to manage your post-operative pain. The decision to use each of these options is based on their risks and benefits.     Medications: You were given one or more of the following medication prescriptions before leaving the hospital. Have the prescriptions filled at a pharmacy on your way home and follow the instructions on the bottles. If you need a refill, please call your pharmacy.      Narcotic Medication (usually Vicodin ES, Lortab, Percocet or Nucynta): Begin taking the medication before your knee starts to hurt. Some patients do not like to take any medication, but if you wait until your pain is severe before taking, you will be very uncomfortable for several hours waiting for the narcotic to work. Always take with food.     Nausea / Vomiting: For this issue, we prescribe Phenergan, use this medication as directed.     Cold Therapy: You may have been sent home with a [facility] cold therapy unit and wrap for your knee. Fill with ice and water to the indicated fill line and use throughout the day for the first two days and then as needed to help relieve pain and control swelling.      Regional Anesthesia Injections (Blocks): You may have been given a regional nerve block either before or after surgery. This may make your entire leg numb for 24-36 hours.                            * Proceed with caution when bearing weight on your leg.     2.   Diet: Eat a bland diet for the first day after surgery. Progress your diet as tolerated. Constipation may occur with Narcotic usage, contact our office if you are experiencing constipation.    3.   Activity: Limit your activity during the first 48 hours, keep your leg elevated with pillows under your heel. After the first 48 hours at home, increase your activity level based on your symptoms.    4.   Dressing Change: Remove the dressing on the 3rd day. It is normal for some blood  to be seen on the dressings. It is also normal for you to see apparent bruising on the skin around your knee when you remove the dressing. If present, leave the steri-strip tape across the incisions. If you are concerned by the drainage or the appearance of your knee, please call one of the numbers listed below.    5.   Showering: You may shower on the 3rd day after surgery with waterproof bandages over small incisions. If you have an incision with Prineo (clear mesh), it does not need to be covered. Do not submerge in any water until after your postoperative appointment in clinic.    6.   Your procedure did not require a post-operative brace.    7.   CPM Device: You may have been sent home with a Continuous Passive Motion (CPM) machine. This device helps relieve pain, improve motion, and heal cartilage. Use the machine for 3 weeks (Settings: Extension 0, Flexion 120). Try to use at night if able. Use 6 hours per day. Call the phone number on the device for return instructions.    8.   Weight Bearing: You may have been sent home with crutches. If instructed (see below), use these crutches at all times unless at complete rest.      [] Non-weight bearing for      weeks (you may touch your toes to the floor)      [] Partial weight bearing for   weeks    [] 25% Body Weight   [] 50% Body Weight      [x] Full weight bearing            [x]  NOW    []  after  weeks     9.  Knee Exercises: Begin these exercises the first day after surgery in order to help you regain your motion and strength. You may do the following marked exercises:     [x] Quad Sets - Begin activating your quadriceps muscle by driving your          knee downward into full knee extension while seated on a table or bed   with a towel rolled and propped under your heel     [x] Straight Leg Raise (SLR) - While mary anne your quadriceps muscle, lift     your fully extended leg to the level of your non-operative knee (as shown)     [x] Heel Slides - With the  "knee straight, slide your heel slowly toward your   buttocks, hold at the endpoint for 10-15 seconds, then slowly straighten     [x] Ankle pumps - With your knee straight, move your ankle in a "pumping"    fashion to activate your calf and leg muscles      10.  Physical Therapy: Physical therapy is an essential component to your recovery from surgery. Your physical therapy will start in 3 days.    FIRST POSTOPERATIVE VISIT: As scheduled.       "

## 2018-03-23 NOTE — NURSING
"Pt c/o of inability to void. Request to have chavez inserted and to leave in till early am. Chavez inserted with slight resistance. Pink tinged urine in tubing. States he felt "immediate relief"  "

## 2018-03-23 NOTE — PLAN OF CARE
"Problem: Physical Therapy Goal  Goal: Physical Therapy Goal  Goals to be met by 3-26-18  1. Sup<>sit mod I  2. Sit<>stand with RW mod I  3. amb 150' with RW mod I  4. Up/down 4' curb step with RW mod I     Pt progressing towards goals, sup to sit Ind, sit to stand with RW and SBA, amb'd with RW and SBA ~150' x 2, WBAT on LLE. Asc/desc 6" curb step with RW and CGA/SBA. HHPT      "

## 2018-03-23 NOTE — PROGRESS NOTES
Pt POD 1 L TKA.    Doing well. Pain controlled overnight. No other complaints. ANGEL.    Vitals:    03/22/18 1840 03/22/18 1925 03/22/18 2338 03/23/18 0443   BP:  (!) 119/59 (!) 108/57 (!) 104/53   BP Location:  Left arm Left arm Left arm   Patient Position:  Lying Lying Lying   Pulse: 84 89 88 82   Resp: 20 (!) 22 16    Temp:  97.5 °F (36.4 °C) 97.9 °F (36.6 °C) 97.7 °F (36.5 °C)   TempSrc:  Oral Oral Oral   SpO2: (!) 93% 95% (!) 92% (!) 94%   Weight:       Height:         LLE- dressing c/d/i   drain dc'd   SILT L4-S1   +TA/GSC/EHL   BCR x5    Pt s/p above. Doing well   Pain control   DVT ppx   mobilize with therapy   IM for medical management   Likely home today

## 2018-03-23 NOTE — PLAN OF CARE
Problem: Patient Care Overview  Goal: Plan of Care Review  VS stable. Medicated for c/o pain with relief. Polar care maintained to LLE. Ambulated to bathroom x 1 with walker. Neurovascular checks WNL to LLE. Veronica with maurizio urine and slightly blood tinged.Will remove this am.

## 2018-03-23 NOTE — PT/OT/SLP PROGRESS
Physical Therapy Treatment    Patient Name:  Sanjeev Gipson   MRN:  [id number]    Recommendations:     Discharge Recommendations:  HHPT  Discharge Equipment Recommendations: walker, rolling   Barriers to discharge: None    Assessment:     Sanjeev Gipson is a 72 y.o. male admitted with a medical diagnosis of Primary osteoarthritis of left knee.  He presents with the following impairments/functional limitations:  weakness, impaired endurance, impaired self care skills, impaired functional mobilty, gait instability, impaired balance, pain, edema, impaired skin, decreased ROM, decreased lower extremity function, orthopedic precautions, decreased safety awareness ; pt with good mobility today, NO LOB or SOB noted, though needing cueing for safety, shayy with turns.    Rehab Prognosis:  excellent; patient would benefit from acute skilled PT services to address these deficits and reach maximum level of function.      Recent Surgery: Procedure(s) (LRB):  REPLACEMENT-KNEE-TOTAL (Left) 1 Day Post-Op    Plan:     During this hospitalization, patient to be seen BID to address the above listed problems via gait training, therapeutic activities, therapeutic exercises  · Plan of Care Expires:  04/22/18   Plan of Care Reviewed with: patient    Subjective     Communicated with nurse prior to session.  Patient found sitting up in recliner chair upon PT entry to room, agreeable to treatment.      Chief Complaint: none stated, only a little knee pain  Patient comments/goals: Pt eager to do therapy, and ready for discharge today.  Pain/Comfort:  · Pain Rating 1: 4/10  · Location - Side 1: Left  · Location - Orientation 1: generalized  · Location 1: knee  · Pain Addressed 1: Pre-medicate for activity, Reposition, Distraction  · Pain Rating Post-Intervention 1: 6/10 (with amb/ex's)    Patients cultural, spiritual, Adventism conflicts given the current situation: none stated    Objective:     Patient found with: peripheral IV,  "cryotherapy     General Precautions: Standard, fall   Orthopedic Precautions:LLE weight bearing as tolerated   Braces: N/A     Functional Mobility:  · Bed Mobility:     · Supine to Sit: independence  · Transfers:     · Sit to Stand:  stand by assistance with rolling walker  · Gait: amb'd with RW and SBA ~150' x 2, WBAT on LLE  · Stairs:  Pt ascended/descended 6" curb step with Rolling Walker with no handrails with Stand-by Assistance and Contact Guard Assistance.       AM-PAC 6 CLICK MOBILITY  Turning over in bed (including adjusting bedclothes, sheets and blankets)?: 4  Sitting down on and standing up from a chair with arms (e.g., wheelchair, bedside commode, etc.): 3  Moving from lying on back to sitting on the side of the bed?: 4  Moving to and from a bed to a chair (including a wheelchair)?: 3  Need to walk in hospital room?: 3  Climbing 3-5 steps with a railing?: 3  Total Score: 20       Therapeutic Activities and Exercises:   pt perf'd supine LE ex's of AP's, QS, SLR's, hip abd/add, heelslides; seated LAQ's and heelslides x 10 ea.   Reviewed HEP booklet with pt.  Seated knee ROM ~100* flexion    Patient left up in chair with all lines intact, call button in reach, nurse notified and cryotherapy to knee..    GOALS:    Physical Therapy Goals        Problem: Physical Therapy Goal    Goal Priority Disciplines Outcome Goal Variances Interventions   Physical Therapy Goal     PT/OT, PT      Description:  Goals to be met by 3-26-18  1. Sup<>sit mod I  2. Sit<>stand with RW mod I  3. amb 150' with RW mod I  4. Up/down 4' curb step with RW mod I                      Time Tracking:     PT Received On: 03/23/18  PT Start Time: 1018     PT Stop Time: 1057  PT Total Time (min): 39 min     Billable Minutes: Gait Training 25 and Therapeutic Exercise 14    Treatment Type: Treatment  PT/PTA: PTA     PTA Visit Number: Miroslava     Leigh Gómez, PTA  03/23/2018  "

## 2018-03-23 NOTE — PROGRESS NOTES
"[facility] Hospital Medicine  Progress Note    Patient Name: Sanjeev Gipson  MRN: [id number]  Patient Class: OP- Outpatient Recovery   Admission Date: 3/22/2018  Length of Stay: 1 days  Attending Physician: Jennifer David MD  Primary Care Provider: Adrian Haskins MD        Subjective:     Principal Problem:Primary osteoarthritis of left knee    HPI:  73 y/o male with hx HTN, Hypothyroidism, Primary localized osteoarthrosis of left lower leg admitted after elective left total knee replacement. Currently c/o "dizziness". States he has this after anesthesia. Denies N/V, no CP or SOB. Past Hx of smoking, drinks a glass of wine a night. Vitals stable.    Consulted for medical management    Hospital Course:  No notes on file    Past Medical History:   Diagnosis Date    Elevated cholesterol     Hypertension     PONV (postoperative nausea and vomiting)     Thyroid disease        Past Surgical History:   Procedure Laterality Date    CARDIAC CATHETERIZATION      EYE SURGERY Bilateral     Cataracts    JOINT REPLACEMENT Right     knee    ROTATOR CUFF REPAIR Left        Review of patient's allergies indicates:  No Known Allergies    No current facility-administered medications on file prior to encounter.      Current Outpatient Prescriptions on File Prior to Encounter   Medication Sig    amLODIPine (NORVASC) 10 MG tablet Take 10 mg by mouth once daily.    atorvastatin (LIPITOR) 20 MG tablet Take 20 mg by mouth once daily.    ibuprofen (ADVIL,MOTRIN) 800 MG tablet 800 mg every 6 (six) hours as needed for Pain.    levothyroxine (SYNTHROID) 50 MCG tablet Take by mouth once daily.    lisinopril 10 MG tablet Take 10 mg by mouth once daily.    omeprazole (PRILOSEC) 20 MG capsule Take 20 mg by mouth as needed.    tamsulosin (FLOMAX) 0.4 mg Cp24 Take 0.4 mg by mouth once daily.     Family History     None        Social History Main Topics    Smoking status: Former Smoker     Packs/day: 2.00     " Years: 10.00    Smokeless tobacco: Never Used    Alcohol use Yes      Comment: glass of wine daily    Drug use: No    Sexual activity: Not on file     Review of Systems   Constitutional: Negative for diaphoresis and fever.   HENT: Negative for congestion, sinus pain and sinus pressure.    Eyes: Negative.    Respiratory: Negative for shortness of breath.    Cardiovascular: Negative for chest pain and palpitations.   Gastrointestinal: Negative for abdominal pain, nausea and vomiting.   Musculoskeletal: Positive for arthralgias.   Skin: Negative.    Neurological: Negative for dizziness, speech difficulty, numbness and headaches.   Psychiatric/Behavioral: Negative for agitation.     Objective:     Vital Signs (Most Recent):  Temp: 97.5 °F (36.4 °C) (03/22/18 1354)  Pulse: 88 (03/22/18 1354)  Resp: 16 (03/22/18 1354)  BP: (!) 121/57 (03/22/18 1354)  SpO2: 98 % (03/22/18 1354) Vital Signs (24h Range):  Temp:  [97.4 °F (36.3 °C)-97.5 °F (36.4 °C)] 97.5 °F (36.4 °C)  Pulse:  [72-93] 88  Resp:  [12-16] 16  SpO2:  [93 %-99 %] 98 %  BP: (121-151)/(52-71) 121/57     Weight: 89.4 kg (196 lb 15.7 oz)  Body mass index is 29.09 kg/m².    Physical Exam   Constitutional: He is oriented to person, place, and time. He appears well-developed and well-nourished. No distress.   HENT:   Head: Normocephalic and atraumatic.   Mouth/Throat: Oropharynx is clear and moist. No oropharyngeal exudate.   Eyes: Conjunctivae are normal. Pupils are equal, round, and reactive to light. Right eye exhibits no discharge. Left eye exhibits no discharge.   Neck: Normal range of motion. Neck supple.   Cardiovascular: Normal rate, regular rhythm, normal heart sounds and intact distal pulses.    Pulmonary/Chest: Effort normal and breath sounds normal. No respiratory distress. He has no wheezes.   Abdominal: Soft. Bowel sounds are normal. He exhibits no distension. There is no tenderness.   Musculoskeletal:   Left LE dressed   Neurological: He is alert and  oriented to person, place, and time. No cranial nerve deficit.   Skin: Skin is warm and dry. Capillary refill takes less than 2 seconds. He is not diaphoretic.   Psychiatric: He has a normal mood and affect.   Nursing note and vitals reviewed.        CRANIAL NERVES     CN III, IV, VI   Pupils are equal, round, and reactive to light.       Significant Labs:   CBC:   Recent Labs  Lab 03/22/18  1207   HGB 14.8   HCT 44.4     All pertinent labs within the past 24 hours have been reviewed.    Significant Imaging: I have reviewed all pertinent imaging results/findings within the past 24 hours.           Assessment/Plan:      * Primary osteoarthritis of left knee    - s/p left TKA 3/22/2018  - ortho recs  - PT/OT  - DVT ppx per Ortho; As patient is post op and a potential bleeding risk, will defer to primary ortho team. Discussed with patient leg muscle flexion every hour to prevent complications.             Urinary retention    - chavez was placed last night d/t retention, now removed, patient voiding          Other specified hypothyroidism    - cont synthroid          Essential hypertension    - currently normotensive  - will hold BP meds d/t dizziness, BP on lower side, discussed with patient to hold BP meds for 2 days, check BP at home, follow up with PCP  - monitor            VTE Risk Mitigation     None            Will sign off, please call if any further questions  Zofia Manzo PA-C  Department of Hospital Medicine   [facility]

## 2018-03-23 NOTE — PLAN OF CARE
Problem: Occupational Therapy Goal  Goal: Occupational Therapy Goal  Goals to be met by 4/23/18  1. Min A LBD  2. Independent UBD  3. Supervision G/H standing at sink  4. Supervision toilet use with RW  5. Good safety with RW for self-care  Outcome: Ongoing (interventions implemented as appropriate)  OT evaluation completed with treatment initiated.  Out-Pt PT treatment is planned for discharge.  DME: None.  CHESTER Garcia 3/23/2018

## 2018-03-23 NOTE — PLAN OF CARE
Problem: Patient Care Overview  Goal: Plan of Care Review  Outcome: Outcome(s) achieved Date Met: 03/23/18  Patient remains free from injury or falls.  Vital signs stable on room air.  Positions self independently. Voiding adequately through shift.  Frequent checks on pt performed at appropriate intervals, monitor/manage analgesia as needed throughout shift.  Bed in low locked position and call light within reach.    Pt given discharge instructions by Helen Swift Rn, with Pt acknowledging understanding. IV d/cd.  Pt leaves med-surg unit via wheelchair transport to go home with family members, taking bedside commode, walker, polar care, cpm machine for Pt use as per MD order.

## 2018-03-23 NOTE — PLAN OF CARE
[facility]  and bedside commode     03/23/18 1350   Final Note   Assessment Type Final Discharge Note   Discharge Disposition Home-Health   What phone number can be called within the next 1-3 days to see how you are doing after discharge? ([phone])   Hospital Follow Up  Appt(s) scheduled? No   Discharge plans and expectations educations in teach back method with documentation complete? No

## 2018-03-23 NOTE — PT/OT/SLP EVAL
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), enjoys golf  Equipment Owned:  cane, straight, bedside commode, walker, rolling  Assistance upon Discharge: his wife is available to assist with his care at discharge    Past Medical History:   Diagnosis Date    Elevated cholesterol     Hypertension     PONV (postoperative nausea and vomiting)     Thyroid disease        Past Surgical History:   Procedure Laterality Date    CARDIAC CATHETERIZATION      EYE SURGERY Bilateral     Cataracts    JOINT REPLACEMENT Right     knee    ROTATOR CUFF REPAIR Left        Subjective     Chief Complaint: us the toilet (difficulty urinating post-surgery)  Patient/Family stated goals: regain PLOF  Communicated with: patient prior to session.  He was seen on the second attempt, agreeable to OT service.  Pain/Comfort:  · Pain Rating 1: 3/10  · Location - Side 1: Left  · Location - Orientation 1: generalized  · Location 1: knee  · Pain Addressed 1: Pre-medicate for activity, Reposition  · Pain Rating Post-Intervention 1: 3/10    Patients cultural, spiritual, Mormonism conflicts given the current situation: none    Objective:     Patient found with: peripheral IV, cryotherapy, SCD    General Precautions: Standard,     Orthopedic Precautions:LLE weight bearing as tolerated   Braces: N/A     Occupational Performance:    Bed Mobility:    · SBA supine>sit EOB    Functional Mobility/Transfers:  · SBA sit><stand  with RW  · Min A toilet transfer with RW  · SBA chair transfer with RW    Ambulated bed>bathroom>chair with RW SBA    Activities of Daily Living:  · MI self-feeding (bed level)  · SBA G/H (brush teeth, wash hands) standing at sink with Rw  · Min A UBD (don hospital gown as robe)-assist for problem solving task  · SBA LBD (doff/don socks) with instructions for TKA precautions (discussed doff/don pants-clothing not available for use)        SBA toilet use in standing (instruction for RW use)      Cognitive/Visual Perceptual:  Cognitive/Psychosocial Skills:     -       Oriented to: Person, Place, Time and Situation   -       Follows Commands/attention:Follows two-step commands  -       Communication: clear/fluent  -       Memory: No Deficits noted  -       Safety awareness/insight to disability: impaired   -       Mood/Affect/Coping skills/emotional control: Appropriate to situation, Cooperative, Anxious and insecure about capabilities  Visual/Perceptual:      -Intact    Physical Exam:  Postural examination/scapula alignment:    -       Rounded shoulders  Skin integrity: Visible skin intact  Edema:  None noted  Sensation:    -       Intact  Motor Planning:    -       Fair - needs assist  Dominant hand:    -       Right  UE ROM: WNL BUE  UE Strength: WNL BUE  Hand Function: WNL BUE  Gross motor coordination:   sitting balance WNL, impaired LE balance-gait, fair safety, low confidence in his capabilities      Patient left up in chair with all lines intact, call button in reach and nurse notified    AM PAC 6 Click:  AM PAC Total Score: 19    Treatment & Education:  Integrating LE exercise into ADLs, pain management post-TKA surgery, home set-up for self-care, RW use and safety, LBD, RW use for  Education:    Assessment:     Sanjeev Gipson is a 72 y.o. male with a medical diagnosis of Primary osteoarthritis of left knee, s/p Left TKA.  He presents with  Performance deficits affecting function are weakness, impaired  "endurance, impaired self care skills, impaired functional mobilty, gait instability, impaired balance, decreased lower extremity function, decreased safety awareness, pain, orthopedic precautions, decreased ROM. Effected performance during the session.  OT treatment is needed to address safety and activity recommendations post-TKA surgery during his hospitalization.     Rehab Prognosis:  good; patient would benefit from acute skilled OT services to address these deficits and reach maximum level of function.         Clinical Decision Makin.  OT Low:  "Pt evaluation falls under low complexity for evaluation coding due to performance deficits noted in 1-3 areas as stated above and 0 co-morbities affecting current functional status. Data obtained from problem focused assessments. No modifications or assistance was required for completion of evaluation. Only brief occupational profile and history review completed."     Plan:     Patient to be seen daily to address the above listed problems via self-care/home management, therapeutic activities, therapeutic exercises  · Plan of Care Expires: 18  · Plan of Care Reviewed with: patient    This Plan of care has been discussed with the patient who was involved in its development and understands and is in agreement with the identified goals and treatment plan    GOALS:    Occupational Therapy Goals        Problem: Occupational Therapy Goal    Goal Priority Disciplines Outcome Interventions   Occupational Therapy Goal     OT, PT/OT Ongoing (interventions implemented as appropriate)    Description:  Goals to be met by 18  1. Min A LBD  2. Independent UBD  3. Supervision G/H standing at sink  4. Supervision toilet use with RW  5. Good safety with RW for self-care                    Time Tracking:     OT Date of Treatment: 18  OT Start Time: 837  OT Stop Time: 920  OT Total Time (min): 43 min    Billable Minutes:Evaluation 26  Self Care/Home Management " 19    CHESTER Garcia  3/23/2018

## 2018-03-23 NOTE — PLAN OF CARE
DHRUV met with pt at bedside to complete discharge assessment, verified PCP and uses [facility] on [address] in [place], [place]  Pt has straight cane, rolling walker and shower chair, needs BSC.  Wife will provide transportation home and pt stated will dc with [facility].  DHRUV called [facility] [phone], spoke to Karla, stated already have HH orders from MD but need clinics.  DHRUV faxed clinics to [phone].       03/23/18 1152   Discharge Assessment   Assessment Type Discharge Planning Assessment   Confirmed/corrected address and phone number on facesheet? Yes   Assessment information obtained from? Patient   Communicated expected length of stay with patient/caregiver no   Prior to hospitilization cognitive status: Alert/Oriented   Prior to hospitalization functional status: Independent   Current cognitive status: Alert/Oriented   Current Functional Status: Assistive Equipment;Needs Assistance   Lives With spouse   Able to Return to Prior Arrangements yes   Is patient able to care for self after discharge? Unable to determine at this time (comments)   Who are your caregiver(s) and their phone number(s)? (Shanta, spouse, [phone])   Patient's perception of discharge disposition home health   Readmission Within The Last 30 Days no previous admission in last 30 days   Patient currently being followed by outpatient case management? No   Patient currently receives any other outside agency services? No   Equipment Currently Used at Home cane, straight;shower chair;walker, rolling   Do you have any problems affording any of your prescribed medications? No   Is the patient taking medications as prescribed? yes   Does the patient have transportation home? Yes   Transportation Available family or friend will provide   Discharge Plan A Home Health   Patient/Family In Agreement With Plan yes

## 2018-03-23 NOTE — NURSING
Discharge instructions reviewed with pt and spouse at length and verbalized 100% understanding. Saline lock rac dc with cath intact and site without redness or swelling. Instructed pt to administer 325mg daily for 6 weeks per Dr. David

## 2018-03-23 NOTE — DISCHARGE SUMMARY
[facility]    Discharge Note    SUMMARY     Admit Date: 3/22/2018    Discharge Date and Time:   03/23/2018 5:55 AM    Pre-op Diagnosis:  Primary localized osteoarthrosis of left lower leg [M17.12]    Post-op Diagnosis:  Post-Op Diagnosis Codes:     * Primary localized osteoarthrosis of left lower leg [M17.12]    Procedure: Procedure(s) (LRB):  REPLACEMENT-KNEE-TOTAL (Left)    Hospital Course (synopsis of major diagnoses, care, treatment, and services provided during the course of the hospital stay): Patient underwent total knee replacement surgery and was transferred to PACU in stable condition.  In PACU, patient received appropriate post-operative care and was transferred to medical floor for neurovascular monitoring and pain control.  There patient progressed appropriately. Once patient was ready, male was discharged home with plans for physical therapy and follow-up with the operative surgeon.    Diet: Regular       Final Diagnosis: Post-Op Diagnosis Codes:     * Primary localized osteoarthrosis of left lower leg [M17.12]    Disposition: Home or Self Care    Follow Up/Patient Instructions:     Medications:  Reconciled Home Medications:   Current Discharge Medication List      CONTINUE these medications which have NOT CHANGED    Details   amLODIPine (NORVASC) 10 MG tablet Take 10 mg by mouth once daily.      ASPIRIN ORAL Take 81 mg by mouth.      atorvastatin (LIPITOR) 20 MG tablet Take 20 mg by mouth once daily.      coenzyme Q10 200 mg capsule Take 200 mg by mouth once daily.      ibuprofen (ADVIL,MOTRIN) 800 MG tablet 800 mg every 6 (six) hours as needed for Pain.      levothyroxine (SYNTHROID) 50 MCG tablet Take by mouth once daily.      lisinopril 10 MG tablet Take 10 mg by mouth once daily.      omega-3s-dha-epa-fish oil 720-1,200 mg Cap Take by mouth.      omeprazole (PRILOSEC) 20 MG capsule Take 20 mg by mouth as needed.      tamsulosin (FLOMAX) 0.4 mg Cp24 Take 0.4 mg by mouth once daily.     "  celecoxib (CELEBREX) 200 MG capsule Take 1 capsule (200 mg total) by mouth 2 (two) times daily.  Qty: 60 capsule, Refills: 0    Associated Diagnoses: Primary osteoarthritis of left knee      cholecalciferol, vitamin D3, (VITAMIN D3) 5,000 unit Tab Take 5,000 Units by mouth once daily.      oxyCODONE-acetaminophen (PERCOCET)  mg per tablet Take 1 tablet by mouth every 4 (four) hours as needed for Pain.  Qty: 60 tablet, Refills: 0    Associated Diagnoses: Primary osteoarthritis of left knee      promethazine (PHENERGAN) 25 MG tablet Take 1 tablet (25 mg total) by mouth every 4 (four) hours as needed for Nausea.  Qty: 30 tablet, Refills: 0    Associated Diagnoses: Primary osteoarthritis of left knee      traMADol (ULTRAM) 50 mg tablet Take 1 tablet (50 mg total) by mouth every 6 (six) hours as needed for Pain.  Qty: 30 tablet, Refills: 0    Associated Diagnoses: Primary osteoarthritis of left knee             Discharge Procedure Orders  WALKER FOR HOME USE   Order Specific Question Answer Comments   Type of Walker: Adult (5'4"-6'6")    With wheels? Yes    Height: 5' 9" (1.753 m)    Weight: 89.4 kg (196 lb 15.7 oz)    Length of need (1-99 months): 1    Does patient have medical equipment at home? walker, rolling    Does patient have medical equipment at home? bedside commode    Does patient have medical equipment at home? cane, straight    Please check all that apply: Patient's condition impairs ambulation.    Please check all that apply: Patient is unable to safely ambulate without equipment.    Please check all that apply: Patient needs help to get in and out of chair.    Please check all that apply: Walker will be used for gait training.      Diet general     Call MD for:  temperature >100.4     Call MD for:  persistent nausea and vomiting     Call MD for:  severe uncontrolled pain     Call MD for:  difficulty breathing, headache or visual disturbances     Call MD for:  redness, tenderness, or signs of " infection (pain, swelling, redness, odor or green/yellow discharge around incision site)     Call MD for:  hives     Call MD for:  persistent dizziness or light-headedness     Call MD for:  extreme fatigue     Weight bearing as tolerated     Remove dressing in 72 hours       Follow-up Information     Jennifer David MD.    Specialties:  Sports Medicine, Orthopedic Surgery  Why:  as scheduled pre op  Contact information:  [address]  [place] [address]  [phone]

## 2018-03-23 NOTE — PT/OT/SLP DISCHARGE
Occupational Therapy Discharge Summary    Sanjeev Gipson  MRN: [id number]   Principal Problem: Primary osteoarthritis of left knee      Patient Discharged from acute Occupational Therapy on 3/23/18.  Please refer to prior OT note dated 3/23/18 for functional status.    Assessment:      Patient appropriate for care in another setting.    Objective:     GOALS:    Occupational Therapy Goals        Problem: Occupational Therapy Goal    Goal Priority Disciplines Outcome Interventions   Occupational Therapy Goal     OT, PT/OT Ongoing (interventions implemented as appropriate)    Description:  Goals to be met by 4/23/18  1. Min A LBD  2. Independent UBD  3. Supervision G/H standing at sink  4. Supervision toilet use with RW  5. Good safety with RW for self-care                    Reasons for Discontinuation of Therapy Services  Transfer to alternate level of care.      Plan:     Patient Discharged to: Home with Home Health Service    CHESTER Garcia  3/23/2018

## 2018-03-23 NOTE — PROGRESS NOTES
Physical Therapy Discharge Summary    Name: Sanjeev Gipson  MRN: [id number]   Principal Problem: Primary osteoarthritis of left knee     Patient Discharged from acute Physical Therapy on 3-23-18.  Please refer to prior PT noted date on 3-23-18 for functional status.     Assessment:     Patient appropriate for care in another setting.    Objective:     GOALS:    Physical Therapy Goals        Problem: Physical Therapy Goal    Goal Priority Disciplines Outcome Goal Variances Interventions   Physical Therapy Goal     PT/OT, PT      Description:  Goals to be met by 3-26-18  1. Sup<>sit mod I  2. Sit<>stand with RW mod I  3. amb 150' with RW mod I  4. Up/down 4' curb step with RW mod I                      Reasons for Discontinuation of Therapy Services  Transfer to alternate level of care.      Plan:     Patient Discharged to: Home with Home Health Service.    Adrian Loredo, PT  3/23/2018

## 2018-03-26 ENCOUNTER — PATIENT MESSAGE (OUTPATIENT)
Dept: SPORTS MEDICINE | Facility: CLINIC | Age: 73
End: 2018-03-26

## 2018-03-26 RX ORDER — HYDROCODONE BITARTRATE AND ACETAMINOPHEN 10; 325 MG/1; MG/1
1 TABLET ORAL
Qty: 60 TABLET | Refills: 0 | Status: SHIPPED | OUTPATIENT
Start: 2018-03-26 | End: 2018-06-22 | Stop reason: SDUPTHER

## 2018-03-28 ENCOUNTER — HOSPITAL ENCOUNTER (OUTPATIENT)
Dept: RADIOLOGY | Facility: HOSPITAL | Age: 73
Discharge: HOME | End: 2018-03-28
Attending: ORTHOPAEDIC SURGERY
Payer: MEDICARE

## 2018-03-28 ENCOUNTER — OFFICE VISIT (OUTPATIENT)
Dept: SPORTS MEDICINE | Facility: CLINIC | Age: 73
End: 2018-03-28
Payer: MEDICARE

## 2018-03-28 VITALS
HEART RATE: 93 BPM | BODY MASS INDEX: 27.99 KG/M2 | SYSTOLIC BLOOD PRESSURE: 128 MMHG | DIASTOLIC BLOOD PRESSURE: 73 MMHG | WEIGHT: 189 LBS | HEIGHT: 69 IN

## 2018-03-28 DIAGNOSIS — Z96.652 H/O TOTAL KNEE REPLACEMENT, LEFT: ICD-10-CM

## 2018-03-28 DIAGNOSIS — M25.562 LEFT KNEE PAIN, UNSPECIFIED CHRONICITY: ICD-10-CM

## 2018-03-28 DIAGNOSIS — Z47.89 SURGICAL AFTERCARE, MUSCULOSKELETAL SYSTEM: Primary | ICD-10-CM

## 2018-03-28 PROBLEM — M17.12 PRIMARY OSTEOARTHRITIS OF LEFT KNEE: Status: RESOLVED | Noted: 2018-02-05 | Resolved: 2018-03-28

## 2018-03-28 PROCEDURE — 73560 X-RAY EXAM OF KNEE 1 OR 2: CPT | Mod: 26,LT,, | Performed by: RADIOLOGY

## 2018-03-28 PROCEDURE — 99999 PR PBB SHADOW E&M-EST. PATIENT-LVL III: CPT | Mod: PBBFAC,,, | Performed by: ORTHOPAEDIC SURGERY

## 2018-03-28 PROCEDURE — 99024 POSTOP FOLLOW-UP VISIT: CPT | Mod: S$GLB,,, | Performed by: ORTHOPAEDIC SURGERY

## 2018-03-28 PROCEDURE — 73560 X-RAY EXAM OF KNEE 1 OR 2: CPT | Mod: TC,FY,PO,LT

## 2018-03-28 NOTE — OPERATIVE NOTE ADDENDUM
Certification of Assistant at Surgery       Surgery Date: 3/22/2018     Participating Surgeons:  Surgeon(s) and Role:     * Jennifer David MD - Primary    Procedures:  Procedure(s) (LRB):  REPLACEMENT-KNEE-TOTAL (Left)    Assistant Surgeon's Certification of Necessity:  I understand that section 1842 (b) (6) (d) of the Social Security Act generally prohibits Medicare Part B reasonable charge payment for the services of assistants at surgery in teaching hospitals when qualified residents are available to furnish such services. I certify that the services for which payment is claimed were medically necessary, and that no qualified resident was available to perform the services. I further understand that these services are subject to post-payment review by the Medicare carrier.      Geovani Payne PA-C    03/28/2018  3:17 PM

## 2018-04-19 ENCOUNTER — PATIENT MESSAGE (OUTPATIENT)
Dept: SPORTS MEDICINE | Facility: CLINIC | Age: 73
End: 2018-04-19

## 2018-04-19 DIAGNOSIS — M17.12 PRIMARY OSTEOARTHRITIS OF LEFT KNEE: ICD-10-CM

## 2018-04-19 RX ORDER — CELECOXIB 200 MG/1
200 CAPSULE ORAL 2 TIMES DAILY
Qty: 60 CAPSULE | Refills: 0 | Status: SHIPPED | OUTPATIENT
Start: 2018-04-19 | End: 2018-05-25 | Stop reason: SDUPTHER

## 2018-04-30 ENCOUNTER — OFFICE VISIT (OUTPATIENT)
Dept: SPORTS MEDICINE | Facility: CLINIC | Age: 73
End: 2018-04-30
Payer: MEDICARE

## 2018-04-30 VITALS
DIASTOLIC BLOOD PRESSURE: 79 MMHG | BODY MASS INDEX: 27.99 KG/M2 | SYSTOLIC BLOOD PRESSURE: 135 MMHG | HEIGHT: 69 IN | WEIGHT: 189 LBS | HEART RATE: 92 BPM

## 2018-04-30 DIAGNOSIS — M25.562 CHRONIC PAIN OF LEFT KNEE: ICD-10-CM

## 2018-04-30 DIAGNOSIS — Z96.652 H/O TOTAL KNEE REPLACEMENT, LEFT: Primary | ICD-10-CM

## 2018-04-30 DIAGNOSIS — G89.29 CHRONIC PAIN OF LEFT KNEE: ICD-10-CM

## 2018-04-30 PROBLEM — Z47.89 SURGICAL AFTERCARE, MUSCULOSKELETAL SYSTEM: Status: RESOLVED | Noted: 2018-03-28 | Resolved: 2018-04-30

## 2018-04-30 PROCEDURE — 97110 THERAPEUTIC EXERCISES: CPT | Mod: 59,GP,S$GLB, | Performed by: ORTHOPAEDIC SURGERY

## 2018-04-30 PROCEDURE — 99999 PR PBB SHADOW E&M-EST. PATIENT-LVL III: CPT | Mod: PBBFAC,,, | Performed by: ORTHOPAEDIC SURGERY

## 2018-04-30 PROCEDURE — 97760 ORTHOTIC MGMT&TRAING 1ST ENC: CPT | Mod: GP,S$GLB,, | Performed by: ORTHOPAEDIC SURGERY

## 2018-04-30 PROCEDURE — 99024 POSTOP FOLLOW-UP VISIT: CPT | Mod: S$GLB,,, | Performed by: ORTHOPAEDIC SURGERY

## 2018-04-30 NOTE — PROGRESS NOTES
Subjective:          Chief Complaint: Sanjeev Gipson is a 72 y.o. male who had concerns including Post-op Evaluation of the Left Knee.      Patient is here for a follow up for his left knee. He is doing PT at [facility] in [place].     DATE OF PROCEDURE:  3/22/2018     ATTENDING SURGEON: Surgeon(s) and Role:     * Jennifer David MD - Primary     FIRST ASSISTANT: Khurram Payne PA-C      No resident or fellow was available throughout the entire procedure as a result it was medically necessary for Khurram Payne PA-C to perform first assistant duties.        PREOPERATIVE DIAGNOSIS: left Arthritis, Traumatic M12.50, DJD knee M17.9 and Genu Varum (acquired) M21.169     POSTOPERATIVE DIAGNOSIS:  left Arthritis, Traumatic M12.50, DJD knee M17.9 and Genu Varum (acquired) M21.169     PROCEDURES PERFORMED:  left Replacement, Knee, Medial and Lateral compartment (Total Knee) 73960               Review of Systems   Constitution: Negative for fever and night sweats.   HENT: Negative for hearing loss.    Eyes: Negative for blurred vision and visual disturbance.   Cardiovascular: Negative for chest pain and leg swelling.   Respiratory: Negative for shortness of breath.    Endocrine: Negative for polyuria.   Hematologic/Lymphatic: Negative for bleeding problem.   Skin: Negative for rash.   Musculoskeletal: Positive for joint pain. Negative for back pain, joint swelling, muscle cramps and muscle weakness.   Gastrointestinal: Negative for melena.   Genitourinary: Negative for hematuria.   Neurological: Negative for loss of balance, numbness and paresthesias.   Psychiatric/Behavioral: Negative for altered mental status.       Pain Related Questions  Over the past 3 days, what was your average pain during activity? (I.e. running, jogging, walking, climbing stairs, getting dressed, ect.): 8  Over the past 3 days, what was your highest pain level?: 8  Over the past 3 days, what was your lowest pain level? : 2    Other  How  many nights a week are you awakened by your affected body part?: 7  Was the patient's HEIGHT measured or patient reported?: Patient Reported  Was the patient's WEIGHT measured or patient reported?: Measured      Objective:        General: Sanjeev is well-developed, well-nourished, appears stated age, in no acute distress, alert and oriented to time, place and person.     General    Vitals reviewed.  Constitutional: He is oriented to person, place, and time. He appears well-developed and well-nourished. No distress.   HENT:   Mouth/Throat: No oropharyngeal exudate.   Eyes: Right eye exhibits no discharge. Left eye exhibits no discharge.   Neck: Normal range of motion.   Pulmonary/Chest: Effort normal and breath sounds normal. No respiratory distress.   Neurological: He is alert and oriented to person, place, and time. He has normal reflexes. No cranial nerve deficit. Coordination normal.   Psychiatric: He has a normal mood and affect. His behavior is normal. Judgment and thought content normal.     General Musculoskeletal Exam   Gait: normal       Right Knee Exam     Inspection   Erythema: absent  Scars: present  Swelling: absent  Effusion: effusion  Deformity: deformity  Bruising: absent    Tenderness   The patient is experiencing no tenderness.         Range of Motion   Extension: 0   Flexion: 130     Tests   Meniscus   Jael:  Medial - negative Lateral - negative  Ligament Examination Lachman: normal (-1 to 2mm) PCL-Posterior Drawer: normal (0 to 2mm)     MCL - Valgus: normal (0 to 2mm)  LCL - Varus: normalPivot Shift: normal (Equal)Reverse Pivot Shift: normal (Equal)Dial Test at 30 degrees: normal (< 5 degrees)Dial Test at 90 degrees: normal (< 5 degrees)  Posterior Sag Test: negative  Posterolateral Corner: unstable (>15 degrees difference)  Patella   Patellar Apprehension: negative  Passive Patellar Tilt: neutral  Patellar Tracking: normal  Patellar Glide (quadrants): Lateral - 1   Medial - 2  Q-Angle at 90  degrees: normal  Patellar Grind: negative  J-Sign: none    Other   Meniscal Cyst: absent  Popliteal (Baker's) Cyst: absent  Sensation: normal    Left Knee Exam     Inspection   Erythema: absent  Scars: present  Swelling: absent  Effusion: absent  Deformity: deformity  Bruising: absent    Tenderness   The patient tender to palpation of the medial joint line.    Range of Motion   Extension: 0   Flexion:  100 abnormal     Tests   Meniscus   Jael:  Medial - negative Lateral - negative  Stability Lachman: normal (-1 to 2mm) PCL-Posterior Drawer: normal (0 to 2mm)  MCL - Valgus: normal (0 to 2mm)  LCL - Varus: normal (0 to 2mm)Pivot Shift: normal (Equal)Reverse Pivot Shift: normal (Equal)Dial Test at 30 degrees: normal (< 5 degrees)Dial Test at 90 degrees: normal (< 5 degrees)  Posterior Sag Test: negative  Posterolateral Corner: unstable (>15 degrees difference)  Patella   Patellar Apprehension: negative  Passive Patellar Tilt: neutral  Patellar Tracking: normal  Patellar Glide (Quadrants): Lateral - 1 Medial - 2  Q-Angle at 90 degrees: normal  Patellar Grind: negative  J-Sign: J sign absent    Other   Meniscal Cyst: absent  Popliteal (Baker's) Cyst: absent  Sensation: normal    Right Hip Exam     Tests   Cynthia: negative  Left Hip Exam     Tests   Cynthia: negative          Muscle Strength   Right Lower Extremity   Hip Abduction: 5/5   Quadriceps:  5/5   Hamstrin/5   Left Lower Extremity   Hip Abduction: 5/5   Quadriceps:  4/5   Hamstrin/5     Reflexes     Left Side  Quadriceps:  2+  Achilles:  2+    Right Side   Quadriceps:  2+  Achilles:  2+    Vascular Exam     Right Pulses  Dorsalis Pedis:      2+  Posterior Tibial:      2+        Left Pulses  Dorsalis Pedis:      2+  Posterior Tibial:      2+                    Assessment:       Encounter Diagnoses   Name Primary?    H/O total knee replacement, left Yes    Chronic pain of left knee           Plan:       1. IKDC, SF-12 and KOOS was not filled out today in  clinic.     RTC in 6 weeks with Dr. Jennifer David Patient will fill out IKDC, SF-12 and KOOS and bilateral knee series on return.    2. Cont PT per protocol - new referral placed today    3. 10426 - Jennifer David MD and Hayde Major, performed a custom orthotic / brace adjustment, fitting and training with the patient. The patient demonstrated understanding and proper care. This was performed for 15 minutes. viscoskin    4. Dynasplint for flexion ordered today  Patient has a contracture of his knee and would benefit from daily stretching outside of physical therapy - which would be optimally accomplished with dynasplint use     5. HEP 73544 - Jennifer David MD and Hayde Major [facility], instructed and demonstrated a patellofemoral and heel to go Gait training HEP. The patient then demonstrated understanding of exercises and proper technique. This program was performed for 20 minutes.               [facility] patient questionnaires have been collected today.

## 2018-05-04 ENCOUNTER — PATIENT MESSAGE (OUTPATIENT)
Dept: SPORTS MEDICINE | Facility: CLINIC | Age: 73
End: 2018-05-04

## 2018-05-14 ENCOUNTER — PATIENT MESSAGE (OUTPATIENT)
Dept: SPORTS MEDICINE | Facility: CLINIC | Age: 73
End: 2018-05-14

## 2018-05-25 DIAGNOSIS — M17.12 PRIMARY OSTEOARTHRITIS OF LEFT KNEE: ICD-10-CM

## 2018-05-26 RX ORDER — CELECOXIB 200 MG/1
200 CAPSULE ORAL 2 TIMES DAILY
Qty: 60 CAPSULE | Refills: 3 | Status: SHIPPED | OUTPATIENT
Start: 2018-05-26 | End: 2018-05-28 | Stop reason: SDUPTHER

## 2018-05-28 DIAGNOSIS — M17.12 PRIMARY OSTEOARTHRITIS OF LEFT KNEE: ICD-10-CM

## 2018-05-28 RX ORDER — CELECOXIB 200 MG/1
200 CAPSULE ORAL 2 TIMES DAILY
Qty: 60 CAPSULE | Refills: 3 | Status: SHIPPED | OUTPATIENT
Start: 2018-05-28 | End: 2018-09-19 | Stop reason: SDUPTHER

## 2018-06-06 ENCOUNTER — HOSPITAL ENCOUNTER (OUTPATIENT)
Dept: RADIOLOGY | Facility: HOSPITAL | Age: 73
Discharge: HOME | End: 2018-06-06
Attending: ORTHOPAEDIC SURGERY
Payer: MEDICARE

## 2018-06-06 ENCOUNTER — OFFICE VISIT (OUTPATIENT)
Dept: SPORTS MEDICINE | Facility: CLINIC | Age: 73
End: 2018-06-06
Payer: MEDICARE

## 2018-06-06 VITALS
SYSTOLIC BLOOD PRESSURE: 136 MMHG | HEART RATE: 88 BPM | BODY MASS INDEX: 29.03 KG/M2 | HEIGHT: 69 IN | WEIGHT: 196 LBS | DIASTOLIC BLOOD PRESSURE: 67 MMHG

## 2018-06-06 DIAGNOSIS — M25.562 LEFT KNEE PAIN, UNSPECIFIED CHRONICITY: Primary | ICD-10-CM

## 2018-06-06 DIAGNOSIS — M25.562 LEFT KNEE PAIN, UNSPECIFIED CHRONICITY: ICD-10-CM

## 2018-06-06 DIAGNOSIS — Z96.652 H/O TOTAL KNEE REPLACEMENT, LEFT: ICD-10-CM

## 2018-06-06 PROCEDURE — 99024 POSTOP FOLLOW-UP VISIT: CPT | Mod: S$GLB,,, | Performed by: ORTHOPAEDIC SURGERY

## 2018-06-06 PROCEDURE — 99999 PR PBB SHADOW E&M-EST. PATIENT-LVL IV: CPT | Mod: PBBFAC,,, | Performed by: ORTHOPAEDIC SURGERY

## 2018-06-06 PROCEDURE — 73564 X-RAY EXAM KNEE 4 OR MORE: CPT | Mod: TC,50,FY,PO

## 2018-06-06 PROCEDURE — 73564 X-RAY EXAM KNEE 4 OR MORE: CPT | Mod: 26,50,, | Performed by: RADIOLOGY

## 2018-06-06 RX ORDER — MUPIROCIN 20 MG/G
OINTMENT TOPICAL
Status: CANCELLED | OUTPATIENT
Start: 2018-06-06

## 2018-06-06 RX ORDER — SODIUM CHLORIDE 9 MG/ML
INJECTION, SOLUTION INTRAVENOUS CONTINUOUS
Status: CANCELLED | OUTPATIENT
Start: 2018-06-06

## 2018-06-06 NOTE — PROGRESS NOTES
Subjective:          Chief Complaint: Sanjeev Gipson is a 73 y.o. male who had concerns including Follow-up of the Left Knee.      Patient is here for a follow up for his left knee. He is doing PT at [facility] in [place]. He received the flexion Dynasplint last week but he is having pain with the brace. He is have a lot of nerve pain.     DATE OF PROCEDURE:  3/22/2018     ATTENDING SURGEON: Surgeon(s) and Role:     * Jennifer David MD - Primary     FIRST ASSISTANT: Khurram Payne PA-C      No resident or fellow was available throughout the entire procedure as a result it was medically necessary for Khurram Payne PA-C to perform first assistant duties.        PREOPERATIVE DIAGNOSIS: left Arthritis, Traumatic M12.50, DJD knee M17.9 and Genu Varum (acquired) M21.169     POSTOPERATIVE DIAGNOSIS:  left Arthritis, Traumatic M12.50, DJD knee M17.9 and Genu Varum (acquired) M21.169     PROCEDURES PERFORMED:  left Replacement, Knee, Medial and Lateral compartment (Total Knee) 64958               Review of Systems   Constitution: Negative for fever and night sweats.   HENT: Negative for hearing loss.    Eyes: Negative for blurred vision and visual disturbance.   Cardiovascular: Negative for chest pain and leg swelling.   Respiratory: Negative for shortness of breath.    Endocrine: Negative for polyuria.   Hematologic/Lymphatic: Negative for bleeding problem.   Skin: Negative for rash.   Musculoskeletal: Positive for joint pain. Negative for back pain, joint swelling, muscle cramps and muscle weakness.   Gastrointestinal: Negative for melena.   Genitourinary: Negative for hematuria.   Neurological: Negative for loss of balance, numbness and paresthesias.   Psychiatric/Behavioral: Negative for altered mental status.       Pain Related Questions  Over the past 3 days, what was your average pain during activity? (I.e. running, jogging, walking, climbing stairs, getting dressed, ect.): 4  Over the past 3 days, what  was your highest pain level?: 8  Over the past 3 days, what was your lowest pain level? : 2    Other  Was the patient's HEIGHT measured or patient reported?: Patient Reported  Was the patient's WEIGHT measured or patient reported?: Measured      Objective:        General: Sanjeev is well-developed, well-nourished, appears stated age, in no acute distress, alert and oriented to time, place and person.     General    Vitals reviewed.  Constitutional: He is oriented to person, place, and time. He appears well-developed and well-nourished. No distress.   HENT:   Mouth/Throat: No oropharyngeal exudate.   Eyes: Right eye exhibits no discharge. Left eye exhibits no discharge.   Neck: Normal range of motion.   Pulmonary/Chest: Effort normal and breath sounds normal. No respiratory distress.   Neurological: He is alert and oriented to person, place, and time. He has normal reflexes. No cranial nerve deficit. Coordination normal.   Psychiatric: He has a normal mood and affect. His behavior is normal. Judgment and thought content normal.     General Musculoskeletal Exam   Gait: normal       Right Knee Exam     Inspection   Erythema: absent  Scars: present  Swelling: absent  Effusion: effusion  Deformity: deformity  Bruising: absent    Tenderness   The patient is experiencing no tenderness.         Range of Motion   Extension: 0   Flexion: 130     Tests   Meniscus   Jael:  Medial - negative Lateral - negative  Ligament Examination Lachman: normal (-1 to 2mm) PCL-Posterior Drawer: normal (0 to 2mm)     MCL - Valgus: normal (0 to 2mm)  LCL - Varus: normalPivot Shift: normal (Equal)Reverse Pivot Shift: normal (Equal)Dial Test at 30 degrees: normal (< 5 degrees)Dial Test at 90 degrees: normal (< 5 degrees)  Posterior Sag Test: negative  Posterolateral Corner: unstable (>15 degrees difference)  Patella   Patellar Apprehension: negative  Passive Patellar Tilt: neutral  Patellar Tracking: normal  Patellar Glide (quadrants): Lateral  - 1   Medial - 2  Q-Angle at 90 degrees: normal  Patellar Grind: negative  J-Sign: none    Other   Meniscal Cyst: absent  Popliteal (Baker's) Cyst: absent  Sensation: normal    Left Knee Exam     Inspection   Erythema: absent  Scars: present  Swelling: absent  Effusion: absent  Deformity: deformity  Bruising: absent    Tenderness   The patient tender to palpation of the medial joint line.    Range of Motion   Extension: 0   Flexion:  110 abnormal     Tests   Meniscus   Jael:  Medial - negative Lateral - negative  Stability Lachman: normal (-1 to 2mm) PCL-Posterior Drawer: normal (0 to 2mm)  MCL - Valgus: normal (0 to 2mm)  LCL - Varus: normal (0 to 2mm)Pivot Shift: normal (Equal)Reverse Pivot Shift: normal (Equal)Dial Test at 30 degrees: normal (< 5 degrees)Dial Test at 90 degrees: normal (< 5 degrees)  Posterior Sag Test: negative  Posterolateral Corner: unstable (>15 degrees difference)  Patella   Patellar Apprehension: negative  Passive Patellar Tilt: neutral  Patellar Tracking: normal  Patellar Glide (Quadrants): Lateral - 1 Medial - 2  Q-Angle at 90 degrees: normal  Patellar Grind: negative  J-Sign: J sign absent    Other   Meniscal Cyst: absent  Popliteal (Baker's) Cyst: absent  Sensation: normal    Right Hip Exam     Tests   Cynthia: negative  Left Hip Exam     Tests   Cynthia: negative          Muscle Strength   Right Lower Extremity   Hip Abduction: 5/5   Quadriceps:  5/5   Hamstrin/5   Left Lower Extremity   Hip Abduction: 5/5   Quadriceps:  4/5   Hamstrin/5     Reflexes     Left Side  Quadriceps:  2+  Achilles:  2+    Right Side   Quadriceps:  2+  Achilles:  2+    Vascular Exam     Right Pulses  Dorsalis Pedis:      2+  Posterior Tibial:      2+        Left Pulses  Dorsalis Pedis:      2+  Posterior Tibial:      2+          RADIOGRAPHS TODAY            Assessment:       Encounter Diagnoses   Name Primary?    Left knee pain, unspecified chronicity Yes    H/O total knee replacement, left            Plan:       1. IKDC, SF-12 and KOOS was filled out today in clinic.     RTC in 2 weeks with Dr. Jennifer David for post-operative appt. Patient will not fill out IKDC, SF-12 and KOOS on return.    2. Continue PT  Aggressive flexion exercises  Wall squats    3. We reviewed with Sanjeev today, the pathology and natural history of his diagnosis. We have discussed a variety of treatment options including medications, physical therapy and other alternative treatments. I also explained the indications, risks and benefits of surgery. After discussion, Sanjeev decided to proceed with surgery. The decision was made to go forward with Friday AT [facility]  1. left knee arthroscopic lysis of adhesions  2. left knee arthroscopic anterior interval release  3. left knee arthroscopic synovectomy  4. left knee manipulation under anesthesia    The details of the surgical procedure were explained, including the location of probable incisions and a description of likely hardware and/or grafts to be used.  The patient understands the likely convalescence after surgery.  Also, we have thoroughly discussed the risks, benefits and alternatives to surgery, including, but not limited to, the risk of infection, joint stiffness, blood clot (including DVT and/or pulmonary embolus), neurologic and vascular injury.  It was explained that, if tissue has been repaired or reconstructed, there is a chance of failure, which may require further management.      All of the patient's questions were answered and informed consent was obtained. The patient will contact us if they have any questions or concerns in the interim.    4. Dynasplint as tolerated 10-15 minutes at a time    5. Continue Celebrex 200mg BID          [facility] patient questionnaires have been collected today.

## 2018-06-06 NOTE — H&P
Sanjeev Gipson  is here for a completion of his perioperative paperwork. he  Is scheduled to undergo   1. left knee arthroscopic lysis of adhesions  2. left knee arthroscopic anterior interval release  3. left knee arthroscopic synovectomy  4. left knee manipulation under anesthesia   on 6/8/18.  He is a healthy individual and does not need clearance for this procedure.     Risks, indications and benefits of the surgical procedure were discussed with the patient. All questions with regard to surgery, rehab, expected return to functional activities, activities of daily living and recreational endeavors were answered to his satisfaction.    Once no other questions were asked, a brief history and physical exam was then performed.      PHYSICAL EXAM:  GEN: A&Ox3, WD WN NAD  HEENT: WNL  CHEST: CTAB, no W/R/R  HEART: RRR, no M/R/G  ABD: Soft, NT ND, BS x4 QUADS  MS; See Epic  NEURO: CN II-XII intact       The surgical consent was then reviewed with the patient, who agreed with all the contents of the consent form and it was signed. he was then given the [facility] surgery packet to bring with him to [facility] for the anesthesia portion of his perioperative paperwork.     PHYSICAL THERAPY:  He was also instructed regarding physical therapy and will begin on  POD3.    POST OP CARE:instructions were reviewed including care of the wound and dressing after surgery and when he can shower.     PAIN MANAGEMENT: Sanjeev Gipson was also given  pain management regime, which includes the TENS unit given to him by Brennon Barrett along with the education required for its use. He was also instructed regarding the Polar ice unit that will be in place after surgery and his postoperative pain medications.     PAIN MEDICATION:  Percocet 10/325mg 1 po q 4-6 hours prn pain  Ultram 50 mg one p.o. q.4-6 hours p.r.n. breakthrough pain,   Phenergan 25 mg one p.o. q.4-6 hours p.r.n. nausea and vomiting.    As there were no other questions to be  asked, he was given my business card along with Jennifer David MD business card if he has any questions or concerns prior to surgery or in the postop period.

## 2018-06-06 NOTE — PATIENT INSTRUCTIONS
Knee Arthroscopy  Knee problems can often be diagnosed and treated with a technique called arthroscopy. This type of surgery is done using an instrument called an arthroscope (scope). Only a few small incisions are needed for this surgery. The procedure can be used to diagnose a knee problem. In many cases, treatment can also be done using arthroscopy.     Insertion of fluid, arthroscope, and instruments through small incisions (portals).         Patient undergoes procedure      The arthroscope  The scope allows the doctor to look directly into the knee joint. It is about the size of a pencil and contains a pathway for fluids. It also contains coated glass fibers that beam an intense, cool light into the knee joint. A camera is attached to the scope as well. It provides clear images of most areas in your knee joint. The doctor views these images on a monitor.  Preparing for the procedure  · Have lab or other testing done as advised.  · Tell your doctor about any medicines or supplements you take  · Do not eat or drink anything for 10 hours before the procedure.  · Once you arrive for surgery, you will be given an IV line in your arm or hand. This provides fluids and medicines.  · To keep you free of pain during the surgery, youll receive medicine called anesthesia. You may have:  ¨ General anesthesia. This puts you into a deep sleep during the surgery.  ¨ Regional anesthesia. This numbs the body from the waist down.  ¨ Local anesthesia. This numbs just the knee.  In addition to regional or local anesthesia, you may receive sedation. This medicine makes you relaxed and sleepy during the surgery.  The procedure  · A few small incisions (portals) are made in your knee.  · The scope is inserted through one of the portals.  · Sterile fluid is put into the knee joint. This makes it easier to see and work inside your joint.  · Using the scope, the doctor confirms the type and degree of knee damage. If possible, the  problem is treated at this time. This is done using surgical tools put through the other portals.  · When the surgery is done, all tools are removed. The incisions are closed with sutures, staples, surgical glue, or strips of surgical tape.  Risks and complications of arthroscopy  All surgeries have risks. The risks of arthroscopy include:  · Bleeding  · Infection  · Blood clots  · Swelling and stiffness of the knee  · Injury to normal tissue  · Continuing knee problems   Date Last Reviewed: 9/20/2015  © 5309-1829 [employer]. [address] [place] [address]. All rights reserved. This information is not intended as a substitute for professional medical care. Always follow your healthcare professional's instructions.        Knee Arthroscopy: Conditions Treated  Arthroscopy is used to find and treat many types of knee problems. These include tears in the meniscal cartilage, joint loose bodies, or anterior cruciate ligament (ACL) tears.     Damaged meniscal cartilage is removed.   Meniscal cartilage tears  There are several types of meniscal cartilage tears. The meniscal cartilage attaches on the tibia (shinbone) and acts as a shock absorber for the knee. Your surgery will depend on the type and extent of your injury. Your surgeon can remove the damaged tissue or fix the tear. Treatment should ease the pain and swelling. It can also help keep the joint from locking.     To replace damaged ACL tissue, a graft of strong, healthy tissue is attached.   ACL tears  A torn ACL (anterior cruciate ligament) can make the knee unstable. You may have pain and swelling, and your knee may give out. Your surgeon can repair the ACL by reconstructing it. To rebuild your ACL, damaged tissue may be replaced with a graft of healthy tissue from an area near your knee, or from a donor.     Date Last Reviewed: 8/31/2015 © 2000-2017 [employer]. [address] [place] [address]. All rights  reserved. This information is not intended as a substitute for professional medical care. Always follow your healthcare professional's instructions.

## 2018-06-07 ENCOUNTER — ANESTHESIA EVENT (OUTPATIENT)
Dept: SURGERY | Facility: HOSPITAL | Age: 73
End: 2018-06-07
Payer: MEDICARE

## 2018-06-07 DIAGNOSIS — M23.92 DERANGEMENT OF LEFT KNEE: ICD-10-CM

## 2018-06-07 DIAGNOSIS — M24.662 FIBROSIS OF LEFT KNEE JOINT: Primary | ICD-10-CM

## 2018-06-07 RX ORDER — TRAMADOL HYDROCHLORIDE 50 MG/1
50 TABLET ORAL EVERY 6 HOURS PRN
COMMUNITY
End: 2018-06-22 | Stop reason: SDUPTHER

## 2018-06-07 NOTE — ANESTHESIA PREPROCEDURE EVALUATION
06/07/2018  Sanjeev Gipson is a 73 y.o., male.    Anesthesia Evaluation         Review of Systems  Anesthesia Hx:  Hx of Anesthetic complications PON[facility] Personal Hx of Anesthesia complications, Post-Operative Nausea/Vomiting, in the past, but not with recent anesthetics / prophylaxis   Cardiovascular:   Exercise tolerance: good Hypertension   Pulmonary:  Pulmonary Normal    Renal/:  Renal/ Normal     Hepatic/GI:  Hepatic/GI Normal    Neurological:  Neurology Normal    Endocrine:   Hypothyroidism        Physical Exam  General:  Well nourished    Airway/Jaw/Neck:  Airway Findings: Mouth Opening: Normal General Airway Assessment: Adult  Mallampati: I  TM Distance: Normal, at least 6 cm       Chest/Lungs:  Chest/Lungs Clear    Heart/Vascular:  Heart Findings: Normal            Anesthesia Plan  Type of Anesthesia, risks & benefits discussed:  Anesthesia Type:  general  Patient's Preference:   Intra-op Monitoring Plan: standard ASA monitors  Intra-op Monitoring Plan Comments:   Post Op Pain Control Plan: multimodal analgesia and IV/PO Opioids PRN  Post Op Pain Control Plan Comments:   Induction:   IV  Beta Blocker:  Patient is not currently on a Beta-Blocker (No further documentation required).       Informed Consent: Patient understands risks and agrees with Anesthesia plan.  Questions answered. Anesthesia consent signed with patient.  ASA Score: 2     Day of Surgery Review of History & Physical:    H&P update referred to the surgeon.         Ready For Surgery From Anesthesia Perspective.

## 2018-06-07 NOTE — PRE-PROCEDURE INSTRUCTIONS
PreOp Instructions given:     - Verbal medication information (what to hold and what to take)   - NPO guidelines    - Arrival place directions given;    - Bathing with antibacterial soap   - Don't wear any jewelry or bring any valuables AM of surgery   - No makeup or moisturizer to face   - No perfume/cologne, powder, lotions or aftershave     Pt. verbalized understanding.    HISTORY OF PONV

## 2018-06-08 ENCOUNTER — HOSPITAL ENCOUNTER (OUTPATIENT)
Facility: HOSPITAL | Age: 73
Discharge: HOME | End: 2018-06-08
Attending: ORTHOPAEDIC SURGERY | Admitting: ORTHOPAEDIC SURGERY
Payer: MEDICARE

## 2018-06-08 ENCOUNTER — ANESTHESIA (OUTPATIENT)
Dept: SURGERY | Facility: HOSPITAL | Age: 73
End: 2018-06-08
Payer: MEDICARE

## 2018-06-08 VITALS
HEIGHT: 69 IN | BODY MASS INDEX: 29.03 KG/M2 | HEART RATE: 87 BPM | DIASTOLIC BLOOD PRESSURE: 67 MMHG | RESPIRATION RATE: 12 BRPM | OXYGEN SATURATION: 95 % | SYSTOLIC BLOOD PRESSURE: 128 MMHG | TEMPERATURE: 99 F | WEIGHT: 196 LBS

## 2018-06-08 DIAGNOSIS — G89.29 CHRONIC PAIN OF LEFT KNEE: Primary | ICD-10-CM

## 2018-06-08 DIAGNOSIS — Z96.652 H/O TOTAL KNEE REPLACEMENT, LEFT: ICD-10-CM

## 2018-06-08 DIAGNOSIS — M25.562 CHRONIC PAIN OF LEFT KNEE: Primary | ICD-10-CM

## 2018-06-08 DIAGNOSIS — M25.562 LEFT KNEE PAIN, UNSPECIFIED CHRONICITY: ICD-10-CM

## 2018-06-08 PROCEDURE — 63600175 PHARM REV CODE 636 W HCPCS: Performed by: ORTHOPAEDIC SURGERY

## 2018-06-08 PROCEDURE — 71000016 HC POSTOP RECOV ADDL HR: Performed by: ORTHOPAEDIC SURGERY

## 2018-06-08 PROCEDURE — 17999 UNLISTD PX SKN MUC MEMB SUBQ: CPT | Mod: ,,, | Performed by: ORTHOPAEDIC SURGERY

## 2018-06-08 PROCEDURE — 64447 NJX AA&/STRD FEMORAL NRV IMG: CPT | Performed by: ANESTHESIOLOGY

## 2018-06-08 PROCEDURE — 25000003 PHARM REV CODE 250: Performed by: ORTHOPAEDIC SURGERY

## 2018-06-08 PROCEDURE — 25000003 PHARM REV CODE 250

## 2018-06-08 PROCEDURE — 25000003 PHARM REV CODE 250: Performed by: NURSE ANESTHETIST, CERTIFIED REGISTERED

## 2018-06-08 PROCEDURE — 63600175 PHARM REV CODE 636 W HCPCS: Performed by: NURSE ANESTHETIST, CERTIFIED REGISTERED

## 2018-06-08 PROCEDURE — D9220A PRA ANESTHESIA: Mod: ANES,,, | Performed by: ANESTHESIOLOGY

## 2018-06-08 PROCEDURE — 36000711: Performed by: ORTHOPAEDIC SURGERY

## 2018-06-08 PROCEDURE — 27201423 OPTIME MED/SURG SUP & DEVICES STERILE SUPPLY: Performed by: ORTHOPAEDIC SURGERY

## 2018-06-08 PROCEDURE — 97116 GAIT TRAINING THERAPY: CPT

## 2018-06-08 PROCEDURE — 71000015 HC POSTOP RECOV 1ST HR: Performed by: ORTHOPAEDIC SURGERY

## 2018-06-08 PROCEDURE — 37000009 HC ANESTHESIA EA ADD 15 MINS: Performed by: ORTHOPAEDIC SURGERY

## 2018-06-08 PROCEDURE — 37000008 HC ANESTHESIA 1ST 15 MINUTES: Performed by: ORTHOPAEDIC SURGERY

## 2018-06-08 PROCEDURE — 71000033 HC RECOVERY, INTIAL HOUR: Performed by: ORTHOPAEDIC SURGERY

## 2018-06-08 PROCEDURE — 36000710: Performed by: ORTHOPAEDIC SURGERY

## 2018-06-08 PROCEDURE — V2790 AMNIOTIC MEMBRANE: HCPCS | Performed by: ORTHOPAEDIC SURGERY

## 2018-06-08 PROCEDURE — 27200651 HC AIRWAY, LMA: Performed by: NURSE ANESTHETIST, CERTIFIED REGISTERED

## 2018-06-08 PROCEDURE — 76942 ECHO GUIDE FOR BIOPSY: CPT | Mod: 26,,, | Performed by: ANESTHESIOLOGY

## 2018-06-08 PROCEDURE — 97161 PT EVAL LOW COMPLEX 20 MIN: CPT

## 2018-06-08 PROCEDURE — 63600175 PHARM REV CODE 636 W HCPCS: Performed by: ANESTHESIOLOGY

## 2018-06-08 PROCEDURE — 29873 ARTHRS KNEE SURG W/LAT RLS: CPT | Mod: 58,51,LT, | Performed by: ORTHOPAEDIC SURGERY

## 2018-06-08 PROCEDURE — 63600175 PHARM REV CODE 636 W HCPCS: Performed by: STUDENT IN AN ORGANIZED HEALTH CARE EDUCATION/TRAINING PROGRAM

## 2018-06-08 PROCEDURE — 29876 ARTHRS KNEE SURG SYNVCT MAJ: CPT | Mod: 58,LT,, | Performed by: ORTHOPAEDIC SURGERY

## 2018-06-08 PROCEDURE — D9220A PRA ANESTHESIA: Mod: CRNA,,, | Performed by: NURSE ANESTHETIST, CERTIFIED REGISTERED

## 2018-06-08 DEVICE — MATRIX REGENERATIVE CLARIX FLO: Type: IMPLANTABLE DEVICE | Site: KNEE | Status: FUNCTIONAL

## 2018-06-08 RX ORDER — ONDANSETRON 2 MG/ML
INJECTION INTRAMUSCULAR; INTRAVENOUS
Status: DISCONTINUED | OUTPATIENT
Start: 2018-06-08 | End: 2018-06-08

## 2018-06-08 RX ORDER — PROPOFOL 10 MG/ML
VIAL (ML) INTRAVENOUS
Status: DISCONTINUED | OUTPATIENT
Start: 2018-06-08 | End: 2018-06-08

## 2018-06-08 RX ORDER — MUPIROCIN 20 MG/G
OINTMENT TOPICAL
Status: DISCONTINUED | OUTPATIENT
Start: 2018-06-08 | End: 2018-06-08 | Stop reason: HOSPADM

## 2018-06-08 RX ORDER — MIDAZOLAM HYDROCHLORIDE 1 MG/ML
0.5 INJECTION INTRAMUSCULAR; INTRAVENOUS
Status: DISCONTINUED | OUTPATIENT
Start: 2018-06-08 | End: 2018-06-08 | Stop reason: HOSPADM

## 2018-06-08 RX ORDER — FENTANYL CITRATE 50 UG/ML
INJECTION, SOLUTION INTRAMUSCULAR; INTRAVENOUS
Status: DISCONTINUED | OUTPATIENT
Start: 2018-06-08 | End: 2018-06-08

## 2018-06-08 RX ORDER — SODIUM CHLORIDE 0.9 % (FLUSH) 0.9 %
3 SYRINGE (ML) INJECTION
Status: DISCONTINUED | OUTPATIENT
Start: 2018-06-08 | End: 2018-06-08 | Stop reason: HOSPADM

## 2018-06-08 RX ORDER — DEXAMETHASONE SODIUM PHOSPHATE 4 MG/ML
INJECTION, SOLUTION INTRA-ARTICULAR; INTRALESIONAL; INTRAMUSCULAR; INTRAVENOUS; SOFT TISSUE
Status: DISCONTINUED | OUTPATIENT
Start: 2018-06-08 | End: 2018-06-08

## 2018-06-08 RX ORDER — LIDOCAINE HYDROCHLORIDE 10 MG/ML
1 INJECTION, SOLUTION EPIDURAL; INFILTRATION; INTRACAUDAL; PERINEURAL ONCE
Status: COMPLETED | OUTPATIENT
Start: 2018-06-08 | End: 2018-06-08

## 2018-06-08 RX ORDER — ACETAMINOPHEN 10 MG/ML
INJECTION, SOLUTION INTRAVENOUS
Status: DISCONTINUED | OUTPATIENT
Start: 2018-06-08 | End: 2018-06-08

## 2018-06-08 RX ORDER — MUPIROCIN 20 MG/G
OINTMENT TOPICAL
Status: COMPLETED
Start: 2018-06-08 | End: 2018-06-08

## 2018-06-08 RX ORDER — LIDOCAINE HCL/PF 100 MG/5ML
SYRINGE (ML) INTRAVENOUS
Status: DISCONTINUED | OUTPATIENT
Start: 2018-06-08 | End: 2018-06-08

## 2018-06-08 RX ORDER — SODIUM CHLORIDE 9 MG/ML
INJECTION, SOLUTION INTRAVENOUS CONTINUOUS
Status: DISCONTINUED | OUTPATIENT
Start: 2018-06-08 | End: 2018-06-08 | Stop reason: HOSPADM

## 2018-06-08 RX ORDER — FENTANYL CITRATE 50 UG/ML
25 INJECTION, SOLUTION INTRAMUSCULAR; INTRAVENOUS EVERY 5 MIN PRN
Status: DISCONTINUED | OUTPATIENT
Start: 2018-06-08 | End: 2018-06-08 | Stop reason: HOSPADM

## 2018-06-08 RX ORDER — FENTANYL CITRATE 50 UG/ML
25 INJECTION, SOLUTION INTRAMUSCULAR; INTRAVENOUS EVERY 5 MIN PRN
Status: COMPLETED | OUTPATIENT
Start: 2018-06-08 | End: 2018-06-08

## 2018-06-08 RX ORDER — LIDOCAINE HYDROCHLORIDE 10 MG/ML
INJECTION, SOLUTION EPIDURAL; INFILTRATION; INTRACAUDAL; PERINEURAL
Status: COMPLETED
Start: 2018-06-08 | End: 2018-06-08

## 2018-06-08 RX ORDER — CEFAZOLIN SODIUM 1 G/3ML
2 INJECTION, POWDER, FOR SOLUTION INTRAMUSCULAR; INTRAVENOUS
Status: COMPLETED | OUTPATIENT
Start: 2018-06-08 | End: 2018-06-08

## 2018-06-08 RX ADMIN — LIDOCAINE HYDROCHLORIDE 80 MG: 20 INJECTION, SOLUTION INTRAVENOUS at 09:06

## 2018-06-08 RX ADMIN — DEXAMETHASONE SODIUM PHOSPHATE 4 MG: 4 INJECTION, SOLUTION INTRAMUSCULAR; INTRAVENOUS at 09:06

## 2018-06-08 RX ADMIN — LIDOCAINE HYDROCHLORIDE 1 MG: 10 INJECTION, SOLUTION EPIDURAL; INFILTRATION; INTRACAUDAL; PERINEURAL at 08:06

## 2018-06-08 RX ADMIN — PROPOFOL 30 MG: 10 INJECTION, EMULSION INTRAVENOUS at 09:06

## 2018-06-08 RX ADMIN — PROPOFOL 150 MG: 10 INJECTION, EMULSION INTRAVENOUS at 09:06

## 2018-06-08 RX ADMIN — SODIUM CHLORIDE, SODIUM GLUCONATE, SODIUM ACETATE, POTASSIUM CHLORIDE, MAGNESIUM CHLORIDE, SODIUM PHOSPHATE, DIBASIC, AND POTASSIUM PHOSPHATE: .53; .5; .37; .037; .03; .012; .00082 INJECTION, SOLUTION INTRAVENOUS at 10:06

## 2018-06-08 RX ADMIN — MUPIROCIN: 20 OINTMENT TOPICAL at 08:06

## 2018-06-08 RX ADMIN — ACETAMINOPHEN 1000 MG: 10 INJECTION, SOLUTION INTRAVENOUS at 09:06

## 2018-06-08 RX ADMIN — ONDANSETRON 4 MG: 2 INJECTION INTRAMUSCULAR; INTRAVENOUS at 10:06

## 2018-06-08 RX ADMIN — FENTANYL CITRATE 25 MCG: 50 INJECTION, SOLUTION INTRAMUSCULAR; INTRAVENOUS at 10:06

## 2018-06-08 RX ADMIN — ROPIVACAINE HYDROCHLORIDE: 5 INJECTION, SOLUTION EPIDURAL; INFILTRATION; PERINEURAL at 10:06

## 2018-06-08 RX ADMIN — MIDAZOLAM HYDROCHLORIDE 2 MG: 1 INJECTION, SOLUTION INTRAMUSCULAR; INTRAVENOUS at 09:06

## 2018-06-08 RX ADMIN — FENTANYL CITRATE 100 MCG: 50 INJECTION INTRAMUSCULAR; INTRAVENOUS at 09:06

## 2018-06-08 RX ADMIN — FENTANYL CITRATE 25 MCG: 50 INJECTION INTRAMUSCULAR; INTRAVENOUS at 01:06

## 2018-06-08 RX ADMIN — FENTANYL CITRATE 25 MCG: 50 INJECTION INTRAMUSCULAR; INTRAVENOUS at 12:06

## 2018-06-08 RX ADMIN — CEFAZOLIN 2 G: 330 INJECTION, POWDER, FOR SOLUTION INTRAMUSCULAR; INTRAVENOUS at 09:06

## 2018-06-08 RX ADMIN — SODIUM CHLORIDE 1000 ML: 0.9 INJECTION, SOLUTION INTRAVENOUS at 08:06

## 2018-06-08 NOTE — BRIEF OP NOTE
[facility]  Brief Operative Note     SUMMARY     Surgery Date: 6/8/2018     Surgeon(s) and Role:     * Jennifer David MD - Primary    Assisting Surgeon: Jasbir Helms MD    Pre-op Diagnosis:  Derangement of left knee [M23.92]  Fibrosis of left knee joint [M24.662]    Post-op Diagnosis:  Post-Op Diagnosis Codes:     * Derangement of left knee [M23.92]     * Fibrosis of left knee joint [M24.662]    Procedure(s) (LRB):  ARTHROSCOPY, KNEE (Left)  RELEASE (Left)  MANIPULATION, KNEE (Left)  INJECTION, STEROID; Amniox left knee (Left)    Anesthesia: General    Description of the findings of the procedure: fibrosis of left knee    Findings/Key Components: fibrosis of left knee; see op note for details    Estimated Blood Loss: minimal    Specimens:   Specimen (12h ago through future)    None          Discharge Note    SUMMARY     Admit Date: 6/8/2018    Discharge Date and Time:  06/08/2018 11:01 AM    Hospital Course (synopsis of major diagnoses, care, treatment, and services provided during the course of the hospital stay): s/p left knee arthroscopy tolerated the procedure without any complications (See op note for details). Pt pain was controlled and will f/u in 2 weeks.       Final Diagnosis: Post-Op Diagnosis Codes:     * Derangement of left knee [M23.92]     * Fibrosis of left knee joint [M24.662]    Disposition: Home or Self Care    Follow Up/Patient Instructions:     Medications:  Reconciled Home Medications:      Medication List      CONTINUE taking these medications    amLODIPine 10 MG tablet  Commonly known as:  NORVASC  Take 10 mg by mouth once daily.     ASPIRIN ORAL  Take 81 mg by mouth.     atorvastatin 20 MG tablet  Commonly known as:  LIPITOR  Take 20 mg by mouth once daily.     celecoxib 200 MG capsule  Commonly known as:  CeleBREX  Take 1 capsule (200 mg total) by mouth 2 (two) times daily.     coenzyme Q10 200 mg capsule  Take 200 mg by mouth once daily.    "  HYDROcodone-acetaminophen  mg per tablet  Commonly known as:  NORCO  Take 1 tablet by mouth every 4 to 6 hours as needed for Pain.     ibuprofen 800 MG tablet  Commonly known as:  ADVIL,MOTRIN  600 mg every 6 (six) hours as needed for Pain.     levothyroxine 50 MCG tablet  Commonly known as:  SYNTHROID  Take by mouth once daily.     lisinopril 10 MG tablet  Take 10 mg by mouth once daily.     omeprazole 20 MG capsule  Commonly known as:  PRILOSEC  Take 20 mg by mouth as needed.     oxyCODONE-acetaminophen  mg per tablet  Commonly known as:  PERCOCET  Take 1 tablet by mouth every 4 (four) hours as needed for Pain.     promethazine 25 MG tablet  Commonly known as:  PHENERGAN  Take 1 tablet (25 mg total) by mouth every 4 (four) hours as needed for Nausea.     tamsulosin 0.4 mg Cp24  Commonly known as:  FLOMAX  Take 0.4 mg by mouth once daily.     traMADol 50 mg tablet  Commonly known as:  ULTRAM  Take 50 mg by mouth every 6 (six) hours as needed for Pain.     VITAMIN D3 5,000 unit Tab  Generic drug:  cholecalciferol (vitamin D3)  Take 5,000 Units by mouth once daily.        STOP taking these medications    omega-3s-dha-epa-fish oil 720-1,200 mg Cap            Discharge Procedure Orders  CRUTCHES FOR HOME USE   Order Specific Question Answer Comments   Type: Axillary    Height: 5' 9" (1.753 m)    Weight: 88.9 kg (196 lb)    Length of need (1-99 months): 2      Call MD for:  temperature >100.4     Call MD for:  persistent nausea and vomiting or diarrhea     Call MD for:  severe uncontrolled pain     Call MD for:  redness, tenderness, or signs of infection (pain, swelling, redness, odor or green/yellow discharge around incision site)     Call MD for:  difficulty breathing or increased cough     Call MD for:  severe persistent headache     Call MD for:  worsening rash     Call MD for:  persistent dizziness, light-headedness, or visual disturbances     Call MD for:  increased confusion or weakness     Call MD " for:     Remove dressing in 72 hours   Order Comments: Change every other day. Keep dry and clean.     Keep surgical extremity elevated     Ice to affected area     Lifting restrictions     Weight bearing restrictions (specify)     Shower on day dressing removed (No bath)       Follow-up Information     Jennifer David MD In 2 weeks.    Specialties:  Sports Medicine, Orthopedic Surgery  Why:  for scheduled post op visit  Contact information:  [address]  [place] [id number] [phone]

## 2018-06-08 NOTE — OP NOTE
DATE OF PROCEDURE: 6/8/2018    ATTENDING SURGEON: Surgeon(s) and Role:     * Jennifer David MD - Primary    ASSISTANTS:  Jasbir Helms MD - Fellow  SMA Rhiannon - Assistant     PREOPERATIVE DIAGNOSIS:  Left  Internal derangement knee M23.90 and Pain, arthralgia M25.569    POSTOPERATIVE DIAGNOSIS:   Left  Internal derangement knee M23.90, Pain, arthralgia M25.569 and Painful hardware T84.84XA    PROCEDURES(S) PERFORMED:   1. Left  Arthroscopy, knee, lateral release 58401, anterior interval  2.  Left  Arthroscopy, with lysis of adhesions 53897  3.  Left  Arthroscopy, knee, synovectomy, major 93067  4.  Left  Amniox Arthrocentesis, 83413    ANESTHESIA: General, Local and Regional w/o Catheter  single shot adductor block, 30 cc 0.5% ropoivicaine mixture    FLUIDS IN THE CASE: 1000 ml    ESTIMATED BLOOD LOSS: Minimal    URINE OUTPUT: 0 ml    COMPLICATIONS: none    CONDITION ON RETURN TO RECOVERY ROOM: Good     Expand All Collapse All       IMPLANTS UTILIZED: Clarix BLANK 100 mg injectable material      INDICATIONS FOR OPERATIVE PROCEDURE: Sanjeev Gipson is a 73 y.o.  male with history of left knee pain and pathology. The patient's history and physical examination findings consistent with the procedure performed. He noted significant problems in the area of concern with problems on activities of daily living and aggressive use of the left leg. As a result of these problems and problems with overall activity level, the patient was deemed to be an appropriate candidate for operative intervention. Nonoperative versus operative options were discussed. The risks and benefits were discussed with the patient. The patient acknowledged understanding and wished to proceed with operative intervention. Informed consent was obtained prior to the procedure. Details of the surgical procedure were explained, including incisions and probable rehabilitation course. The patient understands the likely length of  convalescence after surgery; and we have explained the risks, benefits, and alternatives of surgery. Reasonable expectations and potential complications were discussed and acknowledged, including but not limited to infection, bleeding, blood clots, (DVT and/or PE), nerve injury, retear, instability, continued pain and stiffness. It was also explained that there was a chance of failure which may require further management. The patient agreed and understood and wished to proceed.       FINDINGS:     ARTICULAR CARTILAGE LESION(S):  Medial Femoral Condyle: Medial ConforMIS iTotal components in excellent position; no loosening noted      Lateral Femoral Condyle: Lateral ConforMIS iTotal components in excellent position; no loosening noted        Patellar surface: Patellofemoral ConforMIS iTotal components in excellent position; no loosening noted      EXAMINATION UNDER ANESTHESIA:   Extension 10 degrees  Flexion 90 degrees  Lachman Maneuver:  Negative  Anterior Drawer: Negative  Pivot Shift: Negative  Posterior Drawer:  Negative  Varus stability @ 30 degrees: 0  Valgus stability @ 30 degrees: 0  Patellar glide:0 quadrant lateral, 0 quadrant medial    Post-release:  3 degrees extension  130 degrees flexion  Normal stability anteriorly, posteriorly and varus/valgus        DESCRIPTION OF PROCEDURE: The patient was brought into the Operating Room and placed in supine position. Upon application of Regional w/o Catheter  adductor block in the preoperative holding area, the patient underwent General to stabilize the airway. The patient was given the appropriate dose of antibiotics based on body weight. Timeout was utilized to verify the side as the operative side. Both upper extremities were placed in comfortable position. Examination under anesthesia was performed. The nonoperative leg was carefully padded along the heel and peroneal nerve regions and maintained flat on the table. The operative leg was then stabilized with a  lateral post for intra-operative positioning as well as a popliteal post placed at the mid-calf level.  No bump was placed under the hip on the operative side. The operative leg was prepped and draped in a sterile fashion with ChloraPrep material.    We injected 0.5% ropivacaine mixture into the anterolateral and anteromedial aspect of the knee with application of 15 mL per portal site. A #11 blade was used to make the arthroscopic portals. Blunt trocar and sheath were inserted into the intercondylar notch and then subsequently into the suprapatellar pouch. This patellofemoral joint was visualized, demonstrating normal lateral patellar tilt, normal patellar subluxation. The patellar tracked midline with flexion and extension. Arthroscopic pictures were obtained. There was patellofemoral ConforMIS iTotal components in excellent position; no loosening noted The lesion if present was treated with observation.    Attention was turned to the intercondylar notch where the anterior cruciate ligament (ACL) and posterior cruciate ligament (PCL) structures were visualized. Visualization demonstrated complete tearing of the ACL with an intact PCL. noted     Attention was then turned to the lateral compartment. The patient demonstrated Lateral ConforMIS iTotal components in excellent position; no loosening noted Arthroscopic instrumentation was used to veify no tear and pictures obtained. no articular cartilage damage in the lateral compartment The lesion if present was treated withobservation.    Attention was then turned to the medial compartment. The patient demonstrated Medial ConforMIS iTotal components in excellent position; no loosening noted Arthroscopic instrumentation was used to  veify no tear and pictures obtained. There was no articular cartilage damage in the medial compartment The lesion if present was treated with observation.    Arthroscopic major synovectomy was performed in the suprapatellar pouch as well as  the medial and lateral anterior and gutter regions of the knee.    Arthroscopic anterior interval release was performed using the arthroscopic heat probe through both anteromedial and anterolateral portals while visualizing through the alternate portal.     Final arthroscopic pictures were obtained. Fluid was extravasated from the joint. A 4-0 nylon sutures were used to close the arthroscopic portals. We then injected additional 0.5% ropivicaine mixture using 15 ml per portal site and along then anterior portion of the knee. Amniox arthrocentesis was performed. Xeroform was applied along with application of sterile electrodes proximally and distally, gauze, ABD pads,cast padding, long-leg SONAL hose stocking and cooling unit. No immobilizer was required postoperatively for this patient. The patient was then allowed to recover from anesthesia.  General was removed. The patient was taken to Recovery Room in  Good condition. At the completion case, all instrument and sponge counts were correct.    NOTE: I was present and scrubbed for the key portions of the procedure.    PHYSICAL THERAPY:  The patient should begin physical therapy on postoperative   day # 3 and will be advanced to outpatient therapy as soon as   Possible following discharge.  Weight bearing:as tolerated  left leg  Range of Motion:Full normal motion symmetric to opposite side    No CPM required due to procedure performed        Additional exercises to be performed are:   to begin quad sets with a heel roll to obtain hyperextension, straight leg raise and heel slides with the heel supported in a closed-chain fashion    Immediate specific exercises should include:   Gait program should include the above stated weightbearing; in addition: active extension with a heel-to-toe gait working on maintaining extension    Discharge summary:  The patient was discharged to home in Good  Follow-up as scheduled preoperatively.    Medication(s): Refer to Discharge  Medication List         Resume preoperative diet as tolerated    Activity per outpatient discharge instruction sheet

## 2018-06-08 NOTE — PLAN OF CARE
Discharge instructions reviewed with pt and wife. Understanding verbalized. Pt able to tolerate PO intake. Complaints of pain relieved with PRN medications. PT Elizabeth arrived at bedside to evaluate and teach pt on proper use of crutches. Pt able to urinate in urinal. Transported to car by RN.

## 2018-06-08 NOTE — ANESTHESIA PROCEDURE NOTES
Adductor canal single shot    Patient location during procedure: pre-op   Block not for primary anesthetic.  Reason for block: at surgeon's request and post-op pain management   Post-op Pain Location: left knee pain  Start time: 6/8/2018 9:15 AM  Timeout: 6/8/2018 9:15 AM   End time: 6/8/2018 9:18 AM  Staffing  Anesthesiologist: REEMA ORNELAS  Resident/CRNA: BEHZAD GARCIA JR.  Performed: resident/CRNA   Preanesthetic Checklist  Completed: patient identified, site marked, surgical consent, pre-op evaluation, timeout performed, IV checked, risks and benefits discussed and monitors and equipment checked  Peripheral Block  Patient position: supine  Prep: ChloraPrep  Patient monitoring: heart rate, cardiac monitor, continuous pulse ox, continuous capnometry and frequent blood pressure checks  Block type: adductor canal  Laterality: left  Injection technique: single shot  Needle  Needle type: Stimuplex   Needle gauge: 21 G  Needle length: 4 in  Needle localization: anatomical landmarks and ultrasound guidance   -ultrasound image captured on disc.  Assessment  Injection assessment: negative aspiration, negative parasthesia and local visualized surrounding nerve  Paresthesia pain: none  Heart rate change: no  Slow fractionated injection: yes  Medications:  Bolus administered: 30 mL of 0.25 ropivacaine  Epinephrine added: 3.75 mcg/mL (1/300,000)  Additional Notes  VSS.  [facility] RN monitoring vitals throughout procedure.  Patient tolerated procedure well.

## 2018-06-08 NOTE — BRIEF OP NOTE
[facility]    Brief Operative Note     SUMMARY     Surgery Date: 6/8/2018     Surgeon(s) and Role:     * Jennifer David MD - Primary    Assisting Surgeon: None    Pre-op Diagnosis:  Derangement of left knee [M23.92]  Fibrosis of left knee joint [M24.662]    Post-op Diagnosis:  Post-Op Diagnosis Codes:     * Derangement of left knee [M23.92]     * Fibrosis of left knee joint [M24.662]    Procedure: Procedure(s) (LRB):  ARTHROSCOPY, KNEE (Left)  RELEASE (Left)  MANIPULATION, KNEE (Left)  INJECTION, STEROID; Amniox left knee (Left)    Anesthesia: General    Description of the findings of the procedure: See Dictation    Findings/Key Components: adhesions in suprapatellar pouch and anterior interval    Estimated Blood Loss: * No values recorded between 6/8/2018  9:50 AM and 6/8/2018 11:14 AM *         Specimens:   Specimen (12h ago through future)    None          Disposition: Patient tolerated the procedure well and was transferred to PACU in stable condition.      Discharge Note    SUMMARY     Admit Date: 6/8/2018    Discharge Date and Time:   06/08/2018 11:14 AM    Pre-op Diagnosis:  Derangement of left knee [M23.92]  Fibrosis of left knee joint [M24.662]    Post-op Diagnosis:  Post-Op Diagnosis Codes:     * Derangement of left knee [M23.92]     * Fibrosis of left knee joint [M24.662]    Procedure: Procedure(s) (LRB):  ARTHROSCOPY, KNEE (Left)  RELEASE (Left)  MANIPULATION, KNEE (Left)  INJECTION, STEROID; Amniox left knee (Left)    Hospital Course (synopsis of major diagnoses, care, treatment, and services provided during the course of the hospital stay): Patient underwent outpatient knee surgery and was transferred to PACU in stable condition.  In PACU, patient received appropriate post-operative care and discharged home with plans for physical therapy and follow-up with the operative surgeon.    Diet: Regular       Final Diagnosis: Post-Op Diagnosis Codes:     * Derangement of left knee [M23.92]     *  "Fibrosis of left knee joint [M24.662]    Disposition: Home or Self Care    Follow Up/Patient Instructions:     Medications:  Reconciled Home Medications:      Medication List      CONTINUE taking these medications    amLODIPine 10 MG tablet  Commonly known as:  NORVASC  Take 10 mg by mouth once daily.     ASPIRIN ORAL  Take 81 mg by mouth.     atorvastatin 20 MG tablet  Commonly known as:  LIPITOR  Take 20 mg by mouth once daily.     celecoxib 200 MG capsule  Commonly known as:  CeleBREX  Take 1 capsule (200 mg total) by mouth 2 (two) times daily.     coenzyme Q10 200 mg capsule  Take 200 mg by mouth once daily.     HYDROcodone-acetaminophen  mg per tablet  Commonly known as:  NORCO  Take 1 tablet by mouth every 4 to 6 hours as needed for Pain.     ibuprofen 800 MG tablet  Commonly known as:  ADVIL,MOTRIN  600 mg every 6 (six) hours as needed for Pain.     levothyroxine 50 MCG tablet  Commonly known as:  SYNTHROID  Take by mouth once daily.     lisinopril 10 MG tablet  Take 10 mg by mouth once daily.     omeprazole 20 MG capsule  Commonly known as:  PRILOSEC  Take 20 mg by mouth as needed.     oxyCODONE-acetaminophen  mg per tablet  Commonly known as:  PERCOCET  Take 1 tablet by mouth every 4 (four) hours as needed for Pain.     promethazine 25 MG tablet  Commonly known as:  PHENERGAN  Take 1 tablet (25 mg total) by mouth every 4 (four) hours as needed for Nausea.     tamsulosin 0.4 mg Cp24  Commonly known as:  FLOMAX  Take 0.4 mg by mouth once daily.     traMADol 50 mg tablet  Commonly known as:  ULTRAM  Take 50 mg by mouth every 6 (six) hours as needed for Pain.     VITAMIN D3 5,000 unit Tab  Generic drug:  cholecalciferol (vitamin D3)  Take 5,000 Units by mouth once daily.        STOP taking these medications    omega-3s-dha-epa-fish oil 720-1,200 mg Cap            Discharge Procedure Orders  CRUTCHES FOR HOME USE   Order Specific Question Answer Comments   Type: Axillary    Height: 5' 9" (1.753 m)  "   Weight: 88.9 kg (196 lb)    Length of need (1-99 months): 2      Call MD for:  temperature >100.4     Call MD for:  persistent nausea and vomiting or diarrhea     Call MD for:  severe uncontrolled pain     Call MD for:  redness, tenderness, or signs of infection (pain, swelling, redness, odor or green/yellow discharge around incision site)     Call MD for:  difficulty breathing or increased cough     Call MD for:  severe persistent headache     Call MD for:  worsening rash     Call MD for:  persistent dizziness, light-headedness, or visual disturbances     Call MD for:  increased confusion or weakness     Call MD for:     Remove dressing in 72 hours   Order Comments: Change every other day. Keep dry and clean.     Keep surgical extremity elevated     Ice to affected area     Lifting restrictions     Weight bearing restrictions (specify)     Shower on day dressing removed (No bath)       Follow-up Information     Jennifer David MD In 2 weeks.    Specialties:  Sports Medicine, Orthopedic Surgery  Why:  for scheduled post op visit  Contact information:  [address]  [place] [id number] [phone]

## 2018-06-08 NOTE — DISCHARGE INSTRUCTIONS
[address], [place]                                                                                          [phone]                   Postoperative Instructions for Knee Surgery                 Your Surgery Included:   Open               Arthroscopic   [] Ligament Repair       [x] Diagnostic           [] ACL     [] PCL     [] MCL     [] PLLC      [x] Synovectomy / Plica Removal [] Meniscal Cartilage Repair / Transplantation      [x] Lysis of Adhesions / Manipulation [] Articular Cartilage Repair      [x] Interval Release           [] Microfracture       [] OATS   [] ACI      [] Meniscectomy           [] Osteochondral Allograft      [] Meniscal Cartilage Repair  [] Patellar Realignment       [] Debridement / Chondroplasty         [] Lateral Release   [] Ligament Repair      [] Articular Cartilage Repair          [] Extensor Mechanism             []   Microfracture  []  OATS         []  Cartilage Biopsy [] Tendon Repair          [] Ligament Reconstruction          [] Patella                  [] Quadriceps             []   ACL    []   PCL  [] High Tibial Osteotomy       [] PRP Arthrocentesis  [] Joint Replacement         [] Amniox Arthrocentesis           [] Unicompartmental   [] Patellofemoral                    [] Total Knee                  Call our office ([phone]) immediately if you experience any of the following:       Excessive bleeding or pus like drainage at the incision site       Uncontrollable pain not relieved by pain medication       Excessive swelling or redness at the incision site       Fever above 101.5 degrees not controlled with Tylenol or Motrin       Shortness of Breath       Any foul odor or blistering from the surgery site    FOR EMERGENCIES: If any unusual problems or difficulties occur, call our office at [phone], or page the  at [phone] who will direct your call appropriately    1.   Pain Management: A cold therapy  cuff, pain medications, local injections, and in some cases, regional anesthesia injections are used to manage your post-operative pain. The decision to use each of these options is based on their risks and benefits.     Medications: You were given one or more of the following medication prescriptions before leaving the hospital. Have the prescriptions filled at a pharmacy on your way home and follow the instructions on the bottles. If you need a refill, please call your pharmacy.      Narcotic Medication (usually Vicodin ES, Lortab, Percocet or Nucynta): Begin taking the medication before your knee starts to hurt. Some patients do not like to take any medication, but if you wait until your pain is severe before taking, you will be very uncomfortable for several hours waiting for the narcotic to work. Always take with food.     Nausea / Vomiting: For this issue, we prescribe Phenergan, use this medication as directed.     Cold Therapy: You may have been sent home with a [facility] cold therapy unit and wrap for your knee. Fill with ice and water to the indicated fill line and use throughout the day for the first two days and then as needed to help relieve pain and control swelling.      Regional Anesthesia Injections (Blocks): You may have been given a regional nerve block either before or after surgery. This may make your entire leg numb for 24-36 hours.                            * Proceed with caution when bearing weight on your leg.     2.   Diet: Eat a bland diet for the first day after surgery. Progress your diet as tolerated. Constipation may occur with Narcotic usage, contact our office if you are experiencing constipation.    3.   Activity: Limit your activity during the first 48 hours, keep your leg elevated with pillows under your heel. After the first 48 hours at home, increase your activity level based on your symptoms.    4.   Dressing Change: Remove the dressing on the 3rd day. It is normal for some  "blood to be seen on the dressings. It is also normal for you to see apparent bruising on the skin around your knee when you remove the dressing. If present, leave the steri-strip tape across the incisions. If you are concerned by the drainage or the appearance of your knee, please call one of the numbers listed below.    5.   Showering: You may shower on the 3rd day after surgery with waterproof bandages over small incisions. If you have an incision with Prineo (clear mesh), it does not need to be covered. Do not submerge in any water until after your postoperative appointment in clinic.    6.   Your procedure did not require a post-operative brace.    7.   Your procedure did not require a Continuous Passive Motion (CPM) device.    8.   Weight Bearing: You may have been sent home with crutches. If instructed (see below), use these crutches at all times unless at complete rest.      [] Non-weight bearing for     0 weeks (you may touch your toes to the floor)      [] Partial weight bearing for  0 weeks    [] 25% Body Weight   [] 50% Body Weight      [x] Full weight bearing as tolerated           [x]  NOW    []  after 0 weeks     9.  Knee Exercises: Begin these exercises the first day after surgery in order to help you regain your motion and strength. You may do the following marked exercises:     [x] Quad Sets - Begin activating your quadriceps muscle by driving your          knee downward into full knee extension while seated on a table or bed   with a towel rolled and propped under your heel     [x] Straight Leg Raise (SLR) - While mary anne your quadriceps muscle, lift     your fully extended leg to the level of your non-operative knee (as shown)     [x] Heel Slides - With the knee straight, slide your heel slowly toward your   buttocks, hold at the endpoint for 10-15 seconds, then slowly straighten     [x] Ankle pumps - With your knee straight, move your ankle in a "pumping"    fashion to activate your calf and " leg muscles      10.  Physical Therapy: Physical therapy is an essential component to your recovery from surgery. Your physical therapy will start in 1 days.    FIRST POSTOPERATIVE VISIT: As scheduled.       Recovery After Procedural Sedation (Adult)  You have been given medicine by vein to make you sleep during your surgery. This may have included both a pain medicine and sleeping medicine. Most of the effects have worn off. But you may still have some drowsiness for the next 6 to 8 hours.  Home care  Follow these guidelines when you get home:  · For the next 8 hours, you should be watched by a responsible adult. This person should make sure your condition is not getting worse.  · Don't drink any alcohol for the next 24 hours.  · Don't drive, operate dangerous machinery, or make important business or personal decisions during the next 24 hours.  Note: Your healthcare provider may tell you not to take any medicine by mouth for pain or sleep in the next 4 hours. These medicines may react with the medicines you were given in the hospital. This could cause a much stronger response than usual.  Follow-up care  Follow up with your healthcare provider if you are not alert and back to your usual level of activity within 12 hours.  When to seek medical advice  Call your healthcare provider right away if any of these occur:  · Drowsiness gets worse  · Weakness or dizziness gets worse  · Repeated vomiting  · You can't be awakened   Date Last Reviewed: 10/18/2016  © 8999-8686 [employer]. [address] [place] [address]. All rights reserved. This information is not intended as a substitute for professional medical care. Always follow your healthcare professional's instructions.      PATIENT INSTRUCTIONS  POST-ANESTHESIA    IMMEDIATELY FOLLOWING SURGERY:  Do not drive or operate machinery for the first twenty four hours after surgery.  Do not make any important decisions for twenty four hours after  surgery or while taking narcotic pain medications or sedatives.  If you develop intractable nausea and vomiting or a severe headache please notify your doctor immediately.    FOLLOW-UP:  Please make an appointment with your surgeon as instructed. You do not need to follow up with anesthesia unless specifically instructed to do so.    WOUND CARE INSTRUCTIONS (if applicable):  Keep a dry clean dressing on the anesthesia/puncture wound site if there is drainage.  Once the wound has quit draining you may leave it open to air.  Generally you should leave the bandage intact for twenty four hours unless there is drainage.  If the epidural site drains for more than 36-48 hours please call the anesthesia department.    QUESTIONS?:  Please feel free to call your physician or the hospital  if you have any questions, and they will be happy to assist you.

## 2018-06-08 NOTE — H&P (VIEW-ONLY)
Sanjeev Gipson  is here for a completion of his perioperative paperwork. he  Is scheduled to undergo   1. left knee arthroscopic lysis of adhesions  2. left knee arthroscopic anterior interval release  3. left knee arthroscopic synovectomy  4. left knee manipulation under anesthesia   on 6/8/18.  He is a healthy individual and does not need clearance for this procedure.     Risks, indications and benefits of the surgical procedure were discussed with the patient. All questions with regard to surgery, rehab, expected return to functional activities, activities of daily living and recreational endeavors were answered to his satisfaction.    Once no other questions were asked, a brief history and physical exam was then performed.      PHYSICAL EXAM:  GEN: A&Ox3, WD WN NAD  HEENT: WNL  CHEST: CTAB, no W/R/R  HEART: RRR, no M/R/G  ABD: Soft, NT ND, BS x4 QUADS  MS; See Epic  NEURO: CN II-XII intact       The surgical consent was then reviewed with the patient, who agreed with all the contents of the consent form and it was signed. he was then given the [facility] surgery packet to bring with him to [facility] for the anesthesia portion of his perioperative paperwork.     PHYSICAL THERAPY:  He was also instructed regarding physical therapy and will begin on  POD3.    POST OP CARE:instructions were reviewed including care of the wound and dressing after surgery and when he can shower.     PAIN MANAGEMENT: Sanjeev Gipson was also given  pain management regime, which includes the TENS unit given to him by Brennon Barrett along with the education required for its use. He was also instructed regarding the Polar ice unit that will be in place after surgery and his postoperative pain medications.     PAIN MEDICATION:  Percocet 10/325mg 1 po q 4-6 hours prn pain  Ultram 50 mg one p.o. q.4-6 hours p.r.n. breakthrough pain,   Phenergan 25 mg one p.o. q.4-6 hours p.r.n. nausea and vomiting.    As there were no other questions to be  asked, he was given my business card along with Jennifer David MD business card if he has any questions or concerns prior to surgery or in the postop period.

## 2018-06-08 NOTE — PT/OT/SLP PROGRESS
Physical Therapy Crutch Evaluation/Training    Sanjeev Gipson   MRN: [id number]   Admitting Diagnosis: s/p ARTHROSCOPY, KNEE (Left)  RELEASE (Left)  MANIPULATION, KNEE (Left)  INJECTION, STEROID; Amniox left knee (Left)    PT start time: 1340  PT stop time: 1353    Billable Minutes:  Evaluation 5 min and Gait Training 8 min     Order: PT evaluation and treatment   Order Date: 6/8/18    Precautions Weight Bearing Status: full weight bearing: left leg    Patient Active Problem List   Diagnosis    Left knee pain    Essential hypertension    Other specified hypothyroidism    Urinary retention    H/O total knee replacement, left     Past Medical History:   Diagnosis Date    Elevated cholesterol     Hypertension     PONV (postoperative nausea and vomiting)     Thyroid disease      Past Surgical History:   Procedure Laterality Date    CARDIAC CATHETERIZATION      EYE SURGERY Bilateral     Cataracts    JOINT REPLACEMENT Right     knee    KNEE SURGERY      ROTATOR CUFF REPAIR Left        Subjective Information     Prior level of function: independent  Residence: lives with spouse in 2-story house; able to live on 1st level of home; no stairs to enter home   Support available: wife available for assistance as needed   Equipment owned: SPC, axillary crutches   Mental Status: Oriented X 4, Alert and Cooperative  Skin: Intact  Sensation: pt reported numbness of LLE   Posture: Good  Hearing: Intact  Vision:  Intact    Pain at time of assessment: 0/10 located    Objective findings/Assessment  Bed mobility: SBA  Transfers: CGA from EOB x 1 trial with axillary crutches; SBA x 1 trial from EOB with axillary crutches   Gross assessment: Pt with impaired functional mobility following surgical procedure of LLE. Pt would benefit from skilled gait training to reduce fall risk and increase safety and independence with functional ambulation prior to discharge.   Standing balance: fair; able to maintain with SBA-supervision    ROM: N/T on LLE; WFL RLE   Strength: N/T on LLE; WFL RLE   Patient requires crutch fitting and training.  Patient has decreased functional mobility.    Treatment  Pt and spouse education on:   - role of PT   - safety with mobility and tips to reduce fall risk   - transfer training   - weight bearing precautions (FWB LLE per MD order)     Crutches fitted to appropriate patient height.   Therapist facilitated gait training to improve gait stability and independence with functional ambulation. Pt ambulated x 2 trials of 8 feet with bilateral axillary crutches, supervision provided for safety. No LOB.       AM-PAC 6 CLICK MOBILITY  How much help from another person does this patient currently need?   1 = Unable, Total/Dependent Assistance  2 = A lot, Maximum/Moderate Assistance  3 = A little, Minimum/Contact Guard/Supervision  4 = None, Modified [place]/Independent          AM-PAC Raw Score CMS G-Code Modifier Level of Impairment Assistance   6 % Total / Unable   7 - 9 CM 80 - 100% Maximal Assist   10 - 14 CL 60 - 80% Moderate Assist   15 - 19 CK 40 - 60% Moderate Assist   20 - 22 CJ 20 - 40% Minimal Assist   23 CI 1-20% SBA / CGA   24 CH 0% Independent/ Mod I     Goals/Discharge Status  Patient safely and effectively ambulates with crutches with  supervision,  full weight bearing: left leg on level surfaces. Pt demonstrates good safety awareness with mobility and understanding of education provided by therapist.     Recommended Plan:  Patient to be discharged to home with support from spouse. No further therapy needs anticipated.     Elizabeth Gardiner PT, DPT   6/8/2018  Pager: [phone]

## 2018-06-09 ENCOUNTER — PATIENT MESSAGE (OUTPATIENT)
Dept: SPORTS MEDICINE | Facility: CLINIC | Age: 73
End: 2018-06-09

## 2018-06-11 NOTE — ANESTHESIA POSTPROCEDURE EVALUATION
"Anesthesia Post Evaluation    Patient: Sanjeev Gipson    Procedure(s) Performed: Procedure(s) (LRB):  ARTHROSCOPY, KNEE (Left)  RELEASE (Left)  MANIPULATION, KNEE (Left)  INJECTION, STEROID; Amniox left knee (Left)    Final Anesthesia Type: general  Patient location during evaluation: PACU  Patient participation: Yes- Able to Participate  Level of consciousness: awake and alert and oriented  Post-procedure vital signs: reviewed and stable  Pain management: adequate  Airway patency: patent  PONV status at discharge: No PONV  Anesthetic complications: no      Cardiovascular status: blood pressure returned to baseline  Respiratory status: unassisted, spontaneous ventilation and room air  Hydration status: euvolemic  Follow-up not needed.        Visit Vitals  /67   Pulse 87   Temp 37.2 °C (99 °F) (Skin)   Resp 12   Ht 5' 9" (1.753 m)   Wt 88.9 kg (196 lb)   SpO2 95%   BMI 28.94 kg/m²       Pain/Chetan Score: No Data Recorded      "

## 2018-06-22 ENCOUNTER — HOSPITAL ENCOUNTER (OUTPATIENT)
Dept: RADIOLOGY | Facility: HOSPITAL | Age: 73
Discharge: HOME | End: 2018-06-22
Attending: PHYSICIAN ASSISTANT
Payer: MEDICARE

## 2018-06-22 ENCOUNTER — OFFICE VISIT (OUTPATIENT)
Dept: SPORTS MEDICINE | Facility: CLINIC | Age: 73
End: 2018-06-22
Payer: MEDICARE

## 2018-06-22 VITALS
HEART RATE: 44 BPM | SYSTOLIC BLOOD PRESSURE: 121 MMHG | BODY MASS INDEX: 29.03 KG/M2 | WEIGHT: 196 LBS | HEIGHT: 69 IN | DIASTOLIC BLOOD PRESSURE: 67 MMHG

## 2018-06-22 DIAGNOSIS — M25.561 PAIN IN BOTH KNEES, UNSPECIFIED CHRONICITY: ICD-10-CM

## 2018-06-22 DIAGNOSIS — M25.562 PAIN IN BOTH KNEES, UNSPECIFIED CHRONICITY: ICD-10-CM

## 2018-06-22 DIAGNOSIS — M17.12 PRIMARY OSTEOARTHRITIS OF LEFT KNEE: Primary | ICD-10-CM

## 2018-06-22 DIAGNOSIS — Z96.652 H/O TOTAL KNEE REPLACEMENT, LEFT: ICD-10-CM

## 2018-06-22 DIAGNOSIS — M24.662 FIBROSIS OF LEFT KNEE JOINT: ICD-10-CM

## 2018-06-22 DIAGNOSIS — Z09 SURGERY FOLLOW-UP EXAMINATION: ICD-10-CM

## 2018-06-22 PROCEDURE — 99999 PR PBB SHADOW E&M-EST. PATIENT-LVL III: CPT | Mod: PBBFAC,,, | Performed by: PHYSICIAN ASSISTANT

## 2018-06-22 PROCEDURE — 73564 X-RAY EXAM KNEE 4 OR MORE: CPT | Mod: TC,50,FY,PO

## 2018-06-22 PROCEDURE — 99024 POSTOP FOLLOW-UP VISIT: CPT | Mod: S$GLB,,, | Performed by: PHYSICIAN ASSISTANT

## 2018-06-22 PROCEDURE — 73564 X-RAY EXAM KNEE 4 OR MORE: CPT | Mod: 26,50,, | Performed by: RADIOLOGY

## 2018-06-22 RX ORDER — TRAMADOL HYDROCHLORIDE 50 MG/1
50 TABLET ORAL EVERY 12 HOURS PRN
Qty: 30 TABLET | Refills: 0 | Status: SHIPPED | OUTPATIENT
Start: 2018-06-22

## 2018-06-22 RX ORDER — HYDROCODONE BITARTRATE AND ACETAMINOPHEN 10; 325 MG/1; MG/1
1 TABLET ORAL EVERY 12 HOURS PRN
Qty: 40 TABLET | Refills: 0 | Status: SHIPPED | OUTPATIENT
Start: 2018-06-22

## 2018-06-22 NOTE — PROGRESS NOTES
Subjective:          Chief Complaint: Sanjeev Gipson is a 73 y.o. male who had concerns including Pain of the Left Knee.    Pt presents for 2 week post-op evaluation. Pt has no complaints at this time. He has completed 3-4 PT sessions and progressing as scheduled. Pt is out of pain meds. Denies fever, chills, discharge from wound site, and N/V.      DATE OF PROCEDURE: 6/8/2018     ATTENDING SURGEON: Surgeon(s) and Role:     * Jennifer David MD - Primary     ASSISTANTS:  Jasbir Helms MD - Fellow  SMA Rhiannon - Assistant     PREOPERATIVE DIAGNOSIS:  Left  Internal derangement knee M23.90 and Pain, arthralgia M25.569     POSTOPERATIVE DIAGNOSIS:   Left  Internal derangement knee M23.90, Pain, arthralgia M25.569 and Painful hardware T84.84XA     PROCEDURES(S) PERFORMED:   1. Left  Arthroscopy, knee, lateral release 37787, anterior interval  2.  Left  Arthroscopy, with lysis of adhesions 17176  3.  Left  Arthroscopy, knee, synovectomy, major 58900  4.  Left  Amniox Arthrocentesis, 10498      DATE OF PROCEDURE:  3/22/2018     ATTENDING SURGEON: Surgeon(s) and Role:     * Jennifer David MD - Primary     FIRST ASSISTANT: Khurram Payne PA-C      No resident or fellow was available throughout the entire procedure as a result it was medically necessary for Khurram Payne PA-C to perform first assistant duties.        PREOPERATIVE DIAGNOSIS: left Arthritis, Traumatic M12.50, DJD knee M17.9 and Genu Varum (acquired) M21.169     POSTOPERATIVE DIAGNOSIS:  left Arthritis, Traumatic M12.50, DJD knee M17.9 and Genu Varum (acquired) M21.169     PROCEDURES PERFORMED:  left Replacement, Knee, Medial and Lateral compartment (Total Knee) 98594            Review of Systems   Constitution: Negative for chills and fever.   HENT: Negative for congestion and sore throat.    Eyes: Negative for discharge and double vision.   Cardiovascular: Negative for chest pain, palpitations and syncope.   Respiratory: Negative for  cough and shortness of breath.    Endocrine: Negative for cold intolerance and heat intolerance.   Skin: Negative for dry skin and rash.   Musculoskeletal: Positive for joint pain, joint swelling and stiffness. Negative for falls, muscle cramps, muscle weakness and myalgias.   Gastrointestinal: Negative for abdominal pain, nausea and vomiting.   Neurological: Negative for focal weakness, numbness and paresthesias.       Pain Related Questions  Over the past 3 days, what was your average pain during activity? (I.e. running, jogging, walking, climbing stairs, getting dressed, ect.): 1  Over the past 3 days, what was your highest pain level?: 1  Over the past 3 days, what was your lowest pain level? : 0    Other  How many nights a week are you awakened by your affected body part?: 0  Was the patient's HEIGHT measured or patient reported?: Patient Reported  Was the patient's WEIGHT measured or patient reported?: Measured      Objective:        General: Sanjeev is well-developed, well-nourished, appears stated age, in no acute distress, alert and oriented to time, place and person.     General    Vitals reviewed.  Constitutional: He is oriented to person, place, and time. He appears well-developed and well-nourished. No distress.   Eyes: Conjunctivae and EOM are normal. Pupils are equal, round, and reactive to light.   Neck: Normal range of motion. Neck supple. No JVD present.   Cardiovascular: Normal heart sounds and intact distal pulses.    No murmur heard.  Pulmonary/Chest: Effort normal and breath sounds normal. No respiratory distress.   Abdominal: Soft. Bowel sounds are normal. He exhibits no distension. There is no tenderness.   Neurological: He is alert and oriented to person, place, and time. Coordination normal.   Psychiatric: He has a normal mood and affect. His behavior is normal. Judgment and thought content normal.     General Musculoskeletal Exam   Gait: normal       Right Knee Exam     Inspection    Erythema: absent  Scars: present  Swelling: absent  Effusion: effusion  Deformity: deformity  Bruising: absent    Range of Motion   Extension: 0   Flexion:  120 abnormal     Tests   Ligament Examination Lachman: normal (-1 to 2mm) PCL-Posterior Drawer: normal (0 to 2mm)     MCL - Valgus: normal (0 to 2mm)  LCL - Varus: normalPivot Shift: normal (Equal)Reverse Pivot Shift: normal (Equal)Dial Test at 30 degrees: normal (< 5 degrees)  Posterolateral Corner: unstable (>15 degrees difference)  Patella   Patellar Apprehension: negative  Passive Patellar Tilt: neutral  Patellar Tracking: normal  Patellar Glide (quadrants): Lateral - 1   Medial - 2  Patellar Grind: negative    Other   Sensation: normal    Left Knee Exam     Inspection   Erythema: absent  Scars: present  Swelling: present  Effusion: absent  Deformity: deformity  Bruising: absent    Tenderness   The patient tender to palpation of the condyle and medial retinaculum.    Range of Motion   Extension: 0   Flexion: abnormal Left knee flexion: 115.     Tests   Stability Lachman: normal (-1 to 2mm) PCL-Posterior Drawer: normal (0 to 2mm)  MCL - Valgus: normal (0 to 2mm)  LCL - Varus: normal (0 to 2mm)Pivot Shift: normal (Equal)Reverse Pivot Shift: normal (Equal)Dial Test at 30 degrees: normal (< 5 degrees)  Posterolateral Corner: unstable (>15 degrees difference)  Patella   Patellar Apprehension: negative  Passive Patellar Tilt: neutral  Patellar Tracking: normal  Patellar Glide (Quadrants): Lateral - 1 Medial - 2    Other   Sensation: normal    Comments:  Portal sites clean/dry/intact  No sign of infection  Mild swelling  Compartments soft  Neurovascular status intact in extremity  Left Hip Exam     Tests   Cynthia: negative          Muscle Strength   Right Lower Extremity   Hip Abduction: 5/5   Quadriceps:  5/5   Hamstrin/5   Left Lower Extremity   Hip Abduction: 5/5   Quadriceps:  4/5   Hamstrin/5     Vascular Exam     Right Pulses  Dorsalis Pedis:       2+  Posterior Tibial:      2+        Left Pulses  Dorsalis Pedis:      2+  Posterior Tibial:      2+        Edema  Right Lower Leg: absent  Left Lower Leg: absent    Radiographic Findings 06/22/2018:    EXAMINATION:  XR KNEE ORTHO BILAT WITH FLEXION    CLINICAL HISTORY:  Pain in right knee    TECHNIQUE:  AP standing of both knees, PA flexion standing views of both knees, and Merchant views of both knees were performed.  Lateral views of both knees were also performed.    COMPARISON:  06/06/2018    FINDINGS:  Bilateral total knee prostheses are in place.  The prosthesis on the right appears to be well seated and aligned with no joint effusion.    On the left side there is some diffuse soft tissue swelling present around the left knee possibly a little less than there was 2 weeks ago and there is probably some joint effusion present on the left.  No loosening of the prosthesis can be seen.    Xrays of the knees were ordered and reviewed by me today. These findings were discussed and reviewed with the patient.            Assessment:       Encounter Diagnoses   Name Primary?    Primary osteoarthritis of left knee Yes    Fibrosis of left knee joint     H/O total knee replacement, left     Surgery follow-up examination           Plan:       1. Provided patient with operative note.  2. Removed portal sutures.  3. May shower now without covering incisions.  4. Continue  mg once daily for 4 wks.  5. Continue PT per protocol.  6. Pain meds refilled today. Tramadol and Norco  6. IKDC, SF-12 and KOOS was filled out today in clinic. 3 month follow-up    RTC in 4 weeks with Dr. Jennifer David Patient will not fill out IKDC, SF-12 and KOOS on return.    All of the patient's questions were answered and the patient will contact us if they have any questions or concerns in the interim.            [facility] patient questionnaires have been collected today.

## 2018-07-24 ENCOUNTER — OFFICE VISIT (OUTPATIENT)
Dept: SPORTS MEDICINE | Facility: CLINIC | Age: 73
End: 2018-07-24
Payer: MEDICARE

## 2018-07-24 ENCOUNTER — PATIENT MESSAGE (OUTPATIENT)
Dept: SPORTS MEDICINE | Facility: CLINIC | Age: 73
End: 2018-07-24

## 2018-07-24 VITALS — BODY MASS INDEX: 29.03 KG/M2 | WEIGHT: 196 LBS | HEIGHT: 69 IN

## 2018-07-24 DIAGNOSIS — Z96.652 H/O TOTAL KNEE REPLACEMENT, LEFT: ICD-10-CM

## 2018-07-24 DIAGNOSIS — M24.662 FIBROSIS OF LEFT KNEE JOINT: Primary | ICD-10-CM

## 2018-07-24 DIAGNOSIS — Z09 SURGERY FOLLOW-UP EXAMINATION: ICD-10-CM

## 2018-07-24 PROCEDURE — 99024 POSTOP FOLLOW-UP VISIT: CPT | Mod: S$GLB,,, | Performed by: PHYSICIAN ASSISTANT

## 2018-07-24 PROCEDURE — 97110 THERAPEUTIC EXERCISES: CPT | Mod: S$GLB,,, | Performed by: PHYSICIAN ASSISTANT

## 2018-07-24 PROCEDURE — 99999 PR PBB SHADOW E&M-EST. PATIENT-LVL III: CPT | Mod: PBBFAC,,, | Performed by: PHYSICIAN ASSISTANT

## 2018-07-24 NOTE — PROGRESS NOTES
Subjective:          Chief Complaint: Sanjeev Gipson is a 73 y.o. male who had concerns including Pain of the Left Knee.    Pt presents for 6 week post-op evaluation. Pt has no complaints at this time. He has been released from pt. He takes celebrex as needed. He is happy with results.      DATE OF PROCEDURE: 6/8/2018     ATTENDING SURGEON: Surgeon(s) and Role:     * Jennifer David MD - Primary     ASSISTANTS:  Jasbir Helms MD - Fellow  SMA Rhiannon - Assistant     PREOPERATIVE DIAGNOSIS:  Left  Internal derangement knee M23.90 and Pain, arthralgia M25.569     POSTOPERATIVE DIAGNOSIS:   Left  Internal derangement knee M23.90, Pain, arthralgia M25.569 and Painful hardware T84.84XA     PROCEDURES(S) PERFORMED:   1. Left  Arthroscopy, knee, lateral release 40229, anterior interval  2.  Left  Arthroscopy, with lysis of adhesions 81784  3.  Left  Arthroscopy, knee, synovectomy, major 00266  4.  Left  Amniox Arthrocentesis, 49072      DATE OF PROCEDURE:  3/22/2018     ATTENDING SURGEON: Surgeon(s) and Role:     * Jennifer David MD - Primary     FIRST ASSISTANT: Khurram Payne PA-C      No resident or fellow was available throughout the entire procedure as a result it was medically necessary for Khurram Payne PA-C to perform first assistant duties.        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   HENT: Negative for congestion and sore throat.    Eyes: Negative for discharge and double vision.   Cardiovascular: Negative for chest pain, palpitations and syncope.   Respiratory: Negative for cough and shortness of breath.    Endocrine: Negative for  cold intolerance and heat intolerance.   Skin: Negative for dry skin and rash.   Musculoskeletal: Positive for joint pain, joint swelling and stiffness. Negative for falls, muscle cramps, muscle weakness and myalgias.   Gastrointestinal: Negative for abdominal pain, nausea and vomiting.   Neurological: Negative for focal weakness, numbness and paresthesias.       Pain Related Questions  Over the past 3 days, what was your average pain during activity? (I.e. running, jogging, walking, climbing stairs, getting dressed, ect.): 2  Over the past 3 days, what was your highest pain level?: 2  Over the past 3 days, what was your lowest pain level? : 0    Other  How many nights a week are you awakened by your affected body part?: 0  Was the patient's HEIGHT measured or patient reported?: Patient Reported  Was the patient's WEIGHT measured or patient reported?: Measured      Objective:        General: Sanjeev is well-developed, well-nourished, appears stated age, in no acute distress, alert and oriented to time, place and person.     General    Vitals reviewed.  Constitutional: He is oriented to person, place, and time. He appears well-developed and well-nourished. No distress.   Eyes: Conjunctivae and EOM are normal. Pupils are equal, round, and reactive to light.   Neck: Normal range of motion. Neck supple. No JVD present.   Cardiovascular: Normal heart sounds and intact distal pulses.    No murmur heard.  Pulmonary/Chest: Effort normal and breath sounds normal. No respiratory distress.   Abdominal: Soft. Bowel sounds are normal. He exhibits no distension. There is no tenderness.   Neurological: He is alert and oriented to person, place, and time. Coordination normal.   Psychiatric: He has a normal mood and affect. His behavior is normal. Judgment and thought content normal.     General Musculoskeletal Exam   Gait: normal       Right Knee Exam     Inspection   Erythema: absent  Scars: present  Swelling: absent  Effusion:  effusion  Deformity: deformity  Bruising: absent    Range of Motion   Extension: 0   Flexion:  120 abnormal     Tests   Meniscus   Jael:  Medial - negative Lateral - negative  Ligament Examination Lachman: normal (-1 to 2mm) PCL-Posterior Drawer: normal (0 to 2mm)     MCL - Valgus: normal (0 to 2mm)  LCL - Varus: normalPivot Shift: normal (Equal)Reverse Pivot Shift: normal (Equal)Dial Test at 30 degrees: normal (< 5 degrees)Dial Test at 90 degrees: normal (< 5 degrees)  Posterolateral Corner: unstable (>15 degrees difference)  Patella   Patellar Apprehension: negative  Passive Patellar Tilt: neutral  Patellar Tracking: normal  Patellar Glide (quadrants): Lateral - 1   Medial - 2  Patellar Grind: negative    Other   Sensation: normal    Left Knee Exam     Inspection   Erythema: absent  Scars: present  Swelling: present  Effusion: absent  Deformity: deformity  Bruising: absent    Range of Motion   Extension: 0   Flexion: abnormal Left knee flexion: 123.     Tests   Meniscus   Jael:  Medial - negative Lateral - negative  Stability Lachman: normal (-1 to 2mm) PCL-Posterior Drawer: normal (0 to 2mm)  MCL - Valgus: normal (0 to 2mm)  LCL - Varus: normal (0 to 2mm)Pivot Shift: normal (Equal)Reverse Pivot Shift: normal (Equal)Dial Test at 30 degrees: normal (< 5 degrees)Dial Test at 90 degrees: normal (< 5 degrees)  Posterolateral Corner: unstable (>15 degrees difference)  Patella   Patellar Apprehension: negative  Passive Patellar Tilt: neutral  Patellar Tracking: normal  Patellar Glide (Quadrants): Lateral - 1 Medial - 2  Patellar Grind: negative    Other   Sensation: normal    Comments:  Wounds healed      Right Hip Exam     Tests   Cynthia: negative  Left Hip Exam     Tests   Cynthia: negative          Muscle Strength   Right Lower Extremity   Hip Abduction: 5/5   Quadriceps:  5/5   Hamstrin/5   Left Lower Extremity   Hip Abduction: 5/5   Quadriceps:  4/5   Hamstrin/5     Vascular Exam     Right  Pulses  Dorsalis Pedis:      2+  Posterior Tibial:      2+        Left Pulses  Dorsalis Pedis:      2+  Posterior Tibial:      2+        Edema  Right Lower Leg: absent  Left Lower Leg: absent    Radiographic Findings 07/24/2018:    EXAMINATION:  XR KNEE ORTHO BILAT WITH FLEXION    CLINICAL HISTORY:  Pain in right knee    TECHNIQUE:  AP standing of both knees, PA flexion standing views of both knees, and Merchant views of both knees were performed.  Lateral views of both knees were also performed.    COMPARISON:  06/06/2018    FINDINGS:  Bilateral total knee prostheses are in place.  The prosthesis on the right appears to be well seated and aligned with no joint effusion.    On the left side there is some diffuse soft tissue swelling present around the left knee possibly a little less than there was 2 weeks ago and there is probably some joint effusion present on the left.  No loosening of the prosthesis can be seen.    Xrays of the knees were ordered and reviewed by me today. These findings were discussed and reviewed with the patient.            Assessment:       Encounter Diagnoses   Name Primary?    Fibrosis of left knee joint Yes    H/O total knee replacement, left     Surgery follow-up examination           Plan:       1. HEP 19844 - Khurram Payne PA-C, instructed and demonstrated a patellofemoral HEP. The patient then demonstrated understanding of exercises and proper technique. This program was performed for 15 minutes.  2. Ice compress to the affected area 2-3x a day for 15-20 minutes as needed for pain management.  3. Continue Celebrex 200 mg twice daily PRN for pain management.  4. IKDC, SF-12 and KOOS was filled out today in clinic. 3 month follow-up    RTC in 2 months with Dr. Jennifer David Patient will fill out IKDC, SF-12 and KOOS on return and bilateral radiographs.    All of the patient's questions were answered and the patient will contact us if they have any questions or concerns in the  interim.            [facility] patient questionnaires have been collected today.

## 2018-09-19 ENCOUNTER — HOSPITAL ENCOUNTER (OUTPATIENT)
Dept: RADIOLOGY | Facility: HOSPITAL | Age: 73
Discharge: HOME | End: 2018-09-19
Attending: ORTHOPAEDIC SURGERY
Payer: MEDICARE

## 2018-09-19 ENCOUNTER — OFFICE VISIT (OUTPATIENT)
Dept: SPORTS MEDICINE | Facility: CLINIC | Age: 73
End: 2018-09-19
Payer: MEDICARE

## 2018-09-19 VITALS
HEIGHT: 69 IN | BODY MASS INDEX: 29.03 KG/M2 | HEART RATE: 78 BPM | WEIGHT: 196 LBS | DIASTOLIC BLOOD PRESSURE: 74 MMHG | SYSTOLIC BLOOD PRESSURE: 133 MMHG

## 2018-09-19 DIAGNOSIS — M25.562 LEFT KNEE PAIN, UNSPECIFIED CHRONICITY: ICD-10-CM

## 2018-09-19 DIAGNOSIS — Z96.652 H/O TOTAL KNEE REPLACEMENT, LEFT: ICD-10-CM

## 2018-09-19 DIAGNOSIS — M17.12 PRIMARY OSTEOARTHRITIS OF LEFT KNEE: ICD-10-CM

## 2018-09-19 DIAGNOSIS — M25.562 LEFT KNEE PAIN, UNSPECIFIED CHRONICITY: Primary | ICD-10-CM

## 2018-09-19 PROCEDURE — 73564 X-RAY EXAM KNEE 4 OR MORE: CPT | Mod: 26,50,, | Performed by: RADIOLOGY

## 2018-09-19 PROCEDURE — 99213 OFFICE O/P EST LOW 20 MIN: CPT | Mod: PBBFAC,25,PO | Performed by: ORTHOPAEDIC SURGERY

## 2018-09-19 PROCEDURE — 99999 PR PBB SHADOW E&M-EST. PATIENT-LVL III: CPT | Mod: PBBFAC,,, | Performed by: ORTHOPAEDIC SURGERY

## 2018-09-19 PROCEDURE — 73564 X-RAY EXAM KNEE 4 OR MORE: CPT | Mod: TC,50,FY,PO

## 2018-09-19 PROCEDURE — 99024 POSTOP FOLLOW-UP VISIT: CPT | Mod: ,,, | Performed by: ORTHOPAEDIC SURGERY

## 2018-09-19 RX ORDER — CELECOXIB 200 MG/1
200 CAPSULE ORAL 2 TIMES DAILY
Qty: 60 CAPSULE | Refills: 6 | Status: SHIPPED | OUTPATIENT
Start: 2018-09-19 | End: 2019-03-20 | Stop reason: SDUPTHER

## 2018-09-19 NOTE — PROGRESS NOTES
Subjective:          Chief Complaint: Sanjeev Gipson is a 73 y.o. male who had concerns including Post-op Evaluation of the Left Knee.    Pt presents for 3 month post-op evaluation. Pt has no complaints at this time. He has been released from pt. He takes celebrex as needed. He is happy with results.      DATE OF PROCEDURE: 6/8/2018     ATTENDING SURGEON: Surgeon(s) and Role:     * Jennifer David MD - Primary     ASSISTANTS:  Jasbir Helms MD - Fellow  SMA Rhiannon - Assistant     PREOPERATIVE DIAGNOSIS:  Left  Internal derangement knee M23.90 and Pain, arthralgia M25.569     POSTOPERATIVE DIAGNOSIS:   Left  Internal derangement knee M23.90, Pain, arthralgia M25.569 and Painful hardware T84.84XA     PROCEDURES(S) PERFORMED:   1. Left  Arthroscopy, knee, lateral release 56822, anterior interval  2.  Left  Arthroscopy, with lysis of adhesions 70410  3.  Left  Arthroscopy, knee, synovectomy, major 30615  4.  Left  Amniox Arthrocentesis, 90662      DATE OF PROCEDURE:  3/22/2018     ATTENDING SURGEON: Surgeon(s) and Role:     * Jennifer David MD - Primary     FIRST ASSISTANT: Khurram Payne PA-C      No resident or fellow was available throughout the entire procedure as a result it was medically necessary for Khurram Payne PA-C to perform first assistant duties.        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   HENT: Negative for congestion and sore throat.    Eyes: Negative for discharge and double vision.   Cardiovascular: Negative for chest pain, palpitations and syncope.   Respiratory: Negative for cough and shortness of breath.    Endocrine:  Negative for cold intolerance and heat intolerance.   Skin: Negative for dry skin and rash.   Musculoskeletal: Positive for joint pain, joint swelling and stiffness. Negative for falls, muscle cramps, muscle weakness and myalgias.   Gastrointestinal: Negative for abdominal pain, nausea and vomiting.   Neurological: Negative for focal weakness, numbness and paresthesias.       Pain Related Questions  Over the past 3 days, what was your average pain during activity? (I.e. running, jogging, walking, climbing stairs, getting dressed, ect.): 3  Over the past 3 days, what was your highest pain level?: 3  Over the past 3 days, what was your lowest pain level? : 1    Other  How many nights a week are you awakened by your affected body part?: 0  Was the patient's HEIGHT measured or patient reported?: Patient Reported  Was the patient's WEIGHT measured or patient reported?: Measured      Objective:        General: Sanjeev is well-developed, well-nourished, appears stated age, in no acute distress, alert and oriented to time, place and person.     General    Vitals reviewed.  Constitutional: He is oriented to person, place, and time. He appears well-developed and well-nourished. No distress.   HENT:   Mouth/Throat: No oropharyngeal exudate.   Eyes: Conjunctivae and EOM are normal. Pupils are equal, round, and reactive to light. Right eye exhibits no discharge. Left eye exhibits no discharge.   Neck: Normal range of motion. Neck supple. No JVD present.   Cardiovascular: Normal heart sounds and intact distal pulses.    No murmur heard.  Pulmonary/Chest: Effort normal and breath sounds normal. No respiratory distress.   Abdominal: Soft. Bowel sounds are normal. He exhibits no distension. There is no tenderness.   Neurological: He is alert and oriented to person, place, and time. He has normal reflexes. No cranial nerve deficit. Coordination normal.   Psychiatric: He has a normal mood and affect. His behavior is normal. Judgment and  thought content normal.     General Musculoskeletal Exam   Gait: normal       Right Knee Exam     Inspection   Erythema: absent  Scars: present  Swelling: absent  Effusion: effusion  Deformity: deformity  Bruising: absent    Tenderness   The patient is experiencing no tenderness.     Range of Motion   Extension: 0   Flexion:  140 abnormal     Tests   Meniscus   Jael:  Medial - negative Lateral - negative  Ligament Examination Lachman: normal (-1 to 2mm) PCL-Posterior Drawer: normal (0 to 2mm)     MCL - Valgus: normal (0 to 2mm)  LCL - Varus: normalPivot Shift: normal (Equal)Reverse Pivot Shift: normal (Equal)Dial Test at 30 degrees: normal (< 5 degrees)Dial Test at 90 degrees: normal (< 5 degrees)  Posterior Sag Test: negative  Posterolateral Corner: unstable (>15 degrees difference)  Patella   Patellar apprehension: negative  Passive Patellar Tilt: neutral  Patellar Tracking: normal  Patellar Glide (quadrants): Lateral - 1   Medial - 2  Q-Angle at 90 degrees: normal  Patellar Grind: negative  J-Sign: none    Other   Meniscal Cyst: absent  Popliteal (Baker's) Cyst: absent  Sensation: normal    Left Knee Exam     Inspection   Erythema: absent  Scars: present  Swelling: present  Effusion: absent  Deformity: deformity  Bruising: absent    Tenderness   The patient is experiencing no tenderness.     Range of Motion   Extension: 0   Flexion: abnormal Left knee flexion: 130.     Tests   Meniscus   Jael:  Medial - negative Lateral - negative  Stability Lachman: normal (-1 to 2mm) PCL-Posterior Drawer: normal (0 to 2mm)  MCL - Valgus: normal (0 to 2mm)  LCL - Varus: normal (0 to 2mm)Pivot Shift: normal (Equal)Reverse Pivot Shift: normal (Equal)Dial Test at 30 degrees: normal (< 5 degrees)Dial Test at 90 degrees: normal (< 5 degrees)  Posterior Sag Test: negative  Posterolateral Corner: unstable (>15 degrees difference)  Patella   Patellar apprehension: negative  Passive Patellar Tilt: neutral  Patellar Tracking:  normal  Patellar Glide (Quadrants): Lateral - 1 Medial - 2  Q-Angle at 90 degrees: normal  Patellar Grind: negative  J-Sign: J sign absent    Other   Meniscal Cyst: absent  Popliteal (Baker's) Cyst: absent  Sensation: normal    Comments:  Wounds healed      Right Hip Exam     Tests   Cynthia: negative  Left Hip Exam     Tests   Cynthia: negative          Muscle Strength   Right Lower Extremity   Hip Abduction: 5/5   Quadriceps:  5/5   Hamstrin/5   Left Lower Extremity   Hip Abduction: 5/5   Quadriceps:  4/5   Hamstrin/5     Reflexes     Left Side  Quadriceps:  2+  Achilles:  2+    Right Side   Quadriceps:  2+  Achilles:  2+    Vascular Exam     Right Pulses  Dorsalis Pedis:      2+  Posterior Tibial:      2+        Left Pulses  Dorsalis Pedis:      2+  Posterior Tibial:      2+        Edema  Right Lower Leg: absent  Left Lower Leg: absent    Radiographic Findings 2018:    EXAMINATION:  XR KNEE ORTHO BILAT WITH FLEXION    CLINICAL HISTORY:  Pain in right knee    TECHNIQUE:  AP standing of both knees, PA flexion standing views of both knees, and Merchant views of both knees were performed.  Lateral views of both knees were also performed.    COMPARISON:  2018    FINDINGS:  Bilateral total knee prostheses are in place.  The prosthesis on the right appears to be well seated and aligned with no joint effusion.    Xrays of the knees were ordered and reviewed by me today. These findings were discussed and reviewed with the patient.            Assessment:       Encounter Diagnoses   Name Primary?    Left knee pain, unspecified chronicity Yes    Primary osteoarthritis of left knee     H/O total knee replacement, left           Plan:       1.IKDC, SF-12 and KOOS was filled out today in clinic. 3 month follow-up    RTC in 6 months with Dr. Jennifer David Patient will fill out IKDC, SF-12 and KOOS on return and bilateral radiographs.    2. Ice compress to the affected area 2-3x a day for 15-20 minutes as needed  for pain management.    3. Continue Celebrex 200 mg twice daily PRN for pain management. - sent to pharmacy today    4. Continue HEP as previously instructed  May walk and bike as much as he wants    All of the patient's questions were answered and the patient will contact us if they have any questions or concerns in the interim.            [facility] patient questionnaires have been collected today.

## 2019-03-19 NOTE — PROGRESS NOTES
Subjective:          Chief Complaint: Sanjeev Gipson is a 73 y.o. male who had concerns including Pain of the Right Knee.    Pt presents for 3 month post-op evaluation. Pt has no complaints at this time. He has been released from pt. He takes celebrex as needed. He is happy with results.      DATE OF PROCEDURE: 6/8/2018     ATTENDING SURGEON: Surgeon(s) and Role:     * Jennifer David MD - Primary     ASSISTANTS:  Jasbir Helms MD - Fellow  SMA Rhiannon - Assistant     PREOPERATIVE DIAGNOSIS:  Left  Internal derangement knee M23.90 and Pain, arthralgia M25.569     POSTOPERATIVE DIAGNOSIS:   Left  Internal derangement knee M23.90, Pain, arthralgia M25.569 and Painful hardware T84.84XA     PROCEDURES(S) PERFORMED:   1. Left  Arthroscopy, knee, lateral release 56616, anterior interval  2.  Left  Arthroscopy, with lysis of adhesions 12778  3.  Left  Arthroscopy, knee, synovectomy, major 42645  4.  Left  Amniox Arthrocentesis, 68141      DATE OF PROCEDURE:  3/22/2018     ATTENDING SURGEON: Surgeon(s) and Role:     * Jennifer David MD - Primary     FIRST ASSISTANT: Khurram Payne PA-C      No resident or fellow was available throughout the entire procedure as a result it was medically necessary for Khurram Payne PA-C to perform first assistant duties.        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   HENT: Negative for congestion and sore throat.    Eyes: Negative for discharge and double vision.   Cardiovascular: Negative for chest pain, palpitations and syncope.   Respiratory: Negative for cough and shortness of breath.    Endocrine: Negative for  cold intolerance and heat intolerance.   Skin: Negative for dry skin and rash.   Musculoskeletal: Positive for joint pain, joint swelling and stiffness. Negative for falls, muscle cramps, muscle weakness and myalgias.   Gastrointestinal: Negative for abdominal pain, nausea and vomiting.   Neurological: Negative for focal weakness, numbness and paresthesias.       Pain Related Questions  Over the past 3 days, what was your average pain during activity? (I.e. running, jogging, walking, climbing stairs, getting dressed, ect.): 0  Over the past 3 days, what was your highest pain level?: 0  Over the past 3 days, what was your lowest pain level? : 0    Other  How many nights a week are you awakened by your affected body part?: 0  Was the patient's HEIGHT measured or patient reported?: Patient Reported  Was the patient's WEIGHT measured or patient reported?: Measured      Objective:        General: Sanjeev is well-developed, well-nourished, appears stated age, in no acute distress, alert and oriented to time, place and person.     General    Vitals reviewed.  Constitutional: He is oriented to person, place, and time. He appears well-developed and well-nourished. No distress.   HENT:   Mouth/Throat: No oropharyngeal exudate.   Eyes: Conjunctivae and EOM are normal. Pupils are equal, round, and reactive to light. Right eye exhibits no discharge. Left eye exhibits no discharge.   Neck: Normal range of motion. Neck supple. No JVD present.   Cardiovascular: Normal heart sounds and intact distal pulses.    No murmur heard.  Pulmonary/Chest: Effort normal and breath sounds normal. No respiratory distress.   Abdominal: Soft. Bowel sounds are normal. He exhibits no distension. There is no tenderness.   Neurological: He is alert and oriented to person, place, and time. He has normal reflexes. No cranial nerve deficit. Coordination normal.   Psychiatric: He has a normal mood and affect. His behavior is normal. Judgment and thought  content normal.     General Musculoskeletal Exam   Gait: normal       Right Knee Exam     Inspection   Erythema: absent  Scars: present  Swelling: absent  Effusion: absent  Deformity: absent  Bruising: absent    Tenderness   The patient is experiencing no tenderness.     Range of Motion   Extension: 0   Flexion:  140 abnormal     Tests   Meniscus   Jael:  Medial - negative Lateral - negative  Ligament Examination Lachman: normal (-1 to 2mm) PCL-Posterior Drawer: normal (0 to 2mm)     MCL - Valgus: normal (0 to 2mm)  LCL - Varus: normalPivot Shift: normal (Equal)Reverse Pivot Shift: normal (Equal)Dial Test at 30 degrees: normal (< 5 degrees)Dial Test at 90 degrees: normal (< 5 degrees)  Posterior Sag Test: negative  Posterolateral Corner: unstable (>15 degrees difference)  Patella   Patellar apprehension: negative  Passive Patellar Tilt: neutral  Patellar Tracking: normal  Patellar Glide (quadrants): Lateral - 1   Medial - 2  Q-Angle at 90 degrees: normal  Patellar Grind: negative  J-Sign: none    Other   Meniscal Cyst: absent  Popliteal (Baker's) Cyst: absent  Sensation: normal    Left Knee Exam     Inspection   Erythema: absent  Scars: present  Swelling: absent  Effusion: absent  Deformity: absent  Bruising: absent    Tenderness   The patient is experiencing no tenderness.     Range of Motion   Extension: 0   Flexion: abnormal Left knee flexion: 130.     Tests   Meniscus   Jael:  Medial - negative Lateral - negative  Stability Lachman: normal (-1 to 2mm) PCL-Posterior Drawer: normal (0 to 2mm)  MCL - Valgus: normal (0 to 2mm)  LCL - Varus: normal (0 to 2mm)Pivot Shift: normal (Equal)Reverse Pivot Shift: normal (Equal)Dial Test at 30 degrees: normal (< 5 degrees)Dial Test at 90 degrees: normal (< 5 degrees)  Posterior Sag Test: negative  Posterolateral Corner: unstable (>15 degrees difference)  Patella   Patellar apprehension: negative  Passive Patellar Tilt: neutral  Patellar Tracking: normal  Patellar  Glide (Quadrants): Lateral - 1 Medial - 2  Q-Angle at 90 degrees: normal  Patellar Grind: negative  J-Sign: J sign absent    Other   Meniscal Cyst: absent  Popliteal (Baker's) Cyst: absent  Sensation: normal    Comments:  Wounds healed      Right Hip Exam     Tests   Cynthia: negative  Left Hip Exam     Tests   Cynthia: negative          Muscle Strength   Right Lower Extremity   Hip Abduction: 5/5   Quadriceps:  5/5   Hamstrin/5   Left Lower Extremity   Hip Abduction: 5/5   Quadriceps:  4/5   Hamstrin/5     Reflexes     Left Side  Quadriceps:  2+  Achilles:  2+    Right Side   Quadriceps:  2+  Achilles:  2+    Vascular Exam     Right Pulses  Dorsalis Pedis:      2+  Posterior Tibial:      2+        Left Pulses  Dorsalis Pedis:      2+  Posterior Tibial:      2+        Edema  Right Lower Leg: absent  Left Lower Leg: absent    Radiographic Findings 2019:    EXAMINATION:  XR KNEE ORTHO BILAT WITH FLEXION    CLINICAL HISTORY:  Pain in right knee    TECHNIQUE:  AP standing of both knees, PA flexion standing views of both knees, and Merchant views of both knees were performed.  Lateral views of both knees were also performed.    COMPARISON:  Non 2018 e    FINDINGS:  Bilateral knee arthroplasty is identified.  The position alignment is satisfactory and unchanged as compared to the previous studies      Impression       See above           Assessment:       Encounter Diagnoses   Name Primary?    Right knee pain, unspecified chronicity Yes    Primary osteoarthritis of left knee     Internal derangement of both knees           Plan:       1.IKDC, SF-12 and KOOS was filled out today in clinic. 3 month follow-up    RTC in 1 year with Dr. Jennifer David Patient will fill out IKDC, SF-12 and KOOS on return and bilateral radiographs.    2. Ice compress to the affected area 2-3x a day for 15-20 minutes as needed for pain management.    3. Continue Celebrex 200 mg twice daily PRN for pain management. - sent to  pharmacy today    4. Continue HEP as previously instructed  May walk and bike as much as he wants    All of the patient's questions were answered and the patient will contact us if they have any questions or concerns in the interim.            [facility] patient questionnaires have been collected today.

## 2019-03-20 ENCOUNTER — HOSPITAL ENCOUNTER (OUTPATIENT)
Dept: RADIOLOGY | Facility: HOSPITAL | Age: 74
Discharge: HOME | End: 2019-03-20
Attending: ORTHOPAEDIC SURGERY
Payer: MEDICARE

## 2019-03-20 ENCOUNTER — OFFICE VISIT (OUTPATIENT)
Dept: SPORTS MEDICINE | Facility: CLINIC | Age: 74
End: 2019-03-20
Payer: MEDICARE

## 2019-03-20 VITALS
BODY MASS INDEX: 29.03 KG/M2 | DIASTOLIC BLOOD PRESSURE: 72 MMHG | SYSTOLIC BLOOD PRESSURE: 132 MMHG | HEART RATE: 85 BPM | HEIGHT: 69 IN | WEIGHT: 196 LBS

## 2019-03-20 DIAGNOSIS — M17.12 PRIMARY OSTEOARTHRITIS OF LEFT KNEE: ICD-10-CM

## 2019-03-20 DIAGNOSIS — M25.561 RIGHT KNEE PAIN, UNSPECIFIED CHRONICITY: Primary | ICD-10-CM

## 2019-03-20 DIAGNOSIS — M25.561 RIGHT KNEE PAIN, UNSPECIFIED CHRONICITY: ICD-10-CM

## 2019-03-20 DIAGNOSIS — M23.92 INTERNAL DERANGEMENT OF BOTH KNEES: ICD-10-CM

## 2019-03-20 DIAGNOSIS — M23.91 INTERNAL DERANGEMENT OF BOTH KNEES: ICD-10-CM

## 2019-03-20 PROCEDURE — 3078F DIAST BP <80 MM HG: CPT | Mod: CPTII,S$GLB,, | Performed by: ORTHOPAEDIC SURGERY

## 2019-03-20 PROCEDURE — 73564 X-RAY EXAM KNEE 4 OR MORE: CPT | Mod: TC,50,FY,PO

## 2019-03-20 PROCEDURE — 3075F PR MOST RECENT SYSTOLIC BLOOD PRESS GE 130-139MM HG: ICD-10-PCS | Mod: CPTII,S$GLB,, | Performed by: ORTHOPAEDIC SURGERY

## 2019-03-20 PROCEDURE — 73564 XR KNEE ORTHO BILAT WITH FLEXION: ICD-10-PCS | Mod: 26,50,, | Performed by: RADIOLOGY

## 2019-03-20 PROCEDURE — 99999 PR PBB SHADOW E&M-EST. PATIENT-LVL III: CPT | Mod: PBBFAC,,, | Performed by: ORTHOPAEDIC SURGERY

## 2019-03-20 PROCEDURE — 99214 PR OFFICE/OUTPT VISIT, EST, LEVL IV, 30-39 MIN: ICD-10-PCS | Mod: S$GLB,,, | Performed by: ORTHOPAEDIC SURGERY

## 2019-03-20 PROCEDURE — 3075F SYST BP GE 130 - 139MM HG: CPT | Mod: CPTII,S$GLB,, | Performed by: ORTHOPAEDIC SURGERY

## 2019-03-20 PROCEDURE — 73564 X-RAY EXAM KNEE 4 OR MORE: CPT | Mod: 26,50,, | Performed by: RADIOLOGY

## 2019-03-20 PROCEDURE — 1101F PR PT FALLS ASSESS DOC 0-1 FALLS W/OUT INJ PAST YR: ICD-10-PCS | Mod: CPTII,S$GLB,, | Performed by: ORTHOPAEDIC SURGERY

## 2019-03-20 PROCEDURE — 3078F PR MOST RECENT DIASTOLIC BLOOD PRESSURE < 80 MM HG: ICD-10-PCS | Mod: CPTII,S$GLB,, | Performed by: ORTHOPAEDIC SURGERY

## 2019-03-20 PROCEDURE — 99214 OFFICE O/P EST MOD 30 MIN: CPT | Mod: S$GLB,,, | Performed by: ORTHOPAEDIC SURGERY

## 2019-03-20 PROCEDURE — 1101F PT FALLS ASSESS-DOCD LE1/YR: CPT | Mod: CPTII,S$GLB,, | Performed by: ORTHOPAEDIC SURGERY

## 2019-03-20 PROCEDURE — 99999 PR PBB SHADOW E&M-EST. PATIENT-LVL III: ICD-10-PCS | Mod: PBBFAC,,, | Performed by: ORTHOPAEDIC SURGERY

## 2019-03-20 RX ORDER — CELECOXIB 200 MG/1
200 CAPSULE ORAL 2 TIMES DAILY
Qty: 60 CAPSULE | Refills: 11 | Status: SHIPPED | OUTPATIENT
Start: 2019-03-20

## 2021-04-29 ENCOUNTER — PATIENT MESSAGE (OUTPATIENT)
Dept: RESEARCH | Facility: HOSPITAL | Age: 76
End: 2021-04-29

## 2023-08-03 ENCOUNTER — PATIENT MESSAGE (OUTPATIENT)
Dept: RESEARCH | Facility: HOSPITAL | Age: 78
End: 2023-08-03
Payer: MEDICARE

## (undated) DEVICE — SYRINGE 0.9% NACL 10MIL PREFIL

## (undated) DEVICE — NDL 18GA X1 1/2 REG BEVEL

## (undated) DEVICE — PAD CAST SPECIALIST STRL 6

## (undated) DEVICE — SUT VICRYL+ 1 CT1 18IN

## (undated) DEVICE — SEE MEDLINE ITEM 153151

## (undated) DEVICE — SEE MEDLINE ITEM 152523

## (undated) DEVICE — DRESSING XEROFORM FOIL PK 1X8

## (undated) DEVICE — PAD CAST 6X4YD SPECIALISTIC

## (undated) DEVICE — SEE MEDLINE ITEM 146345

## (undated) DEVICE — TRAY ARTHROSCOPY 2/CS

## (undated) DEVICE — Device

## (undated) DEVICE — PAD ABD 8X10 STERILE

## (undated) DEVICE — SEE MEDLINE ITEM 157150

## (undated) DEVICE — DRAPE PLASTIC U 60X72

## (undated) DEVICE — KIT EVACUATOR 3-SPRING 1/8 DRN

## (undated) DEVICE — SYR ONLY LUER LOCK 20CC

## (undated) DEVICE — PUMP COLD THERAPY

## (undated) DEVICE — PAD ELECTRODE STER 1.5X3

## (undated) DEVICE — DRESSING TELFA PAD N ADH 2X3

## (undated) DEVICE — SUT 3-0 VICRYL PLUS CP-1

## (undated) DEVICE — CONTAINER SPECIMEN STRL 4OZ

## (undated) DEVICE — UNDERGLOVES BIOGEL PI SIZE 8.5

## (undated) DEVICE — GOWN SMART IMP BREATHABLE XXLG

## (undated) DEVICE — ELECTRODE REM PLYHSV RETURN 9

## (undated) DEVICE — BLADE SAW SAG 6.30X12.70X.64

## (undated) DEVICE — APPLICATOR CHLORAPREP ORN 26ML

## (undated) DEVICE — SUT MCRYL PLUS 4-0 PS2 27IN

## (undated) DEVICE — COVER BACK TABLE 72X21

## (undated) DEVICE — UNDERGLOVES BIOGEL PI SIZE 7.5

## (undated) DEVICE — SYR 30CC LUER LOCK

## (undated) DEVICE — TAPE SILK 3IN

## (undated) DEVICE — SOL IRR NACL .9% 3000ML

## (undated) DEVICE — NDL 22GA X1 1/2 REG BEVEL

## (undated) DEVICE — GOWN B1 X-LG X-LONG

## (undated) DEVICE — SYS PRINEO SKIN CLOSURE

## (undated) DEVICE — UNDERGLOVES BIOGEL PI SZ 7 LF

## (undated) DEVICE — SOL NACL 0.9% INJ 250ML BG

## (undated) DEVICE — ELECTRODE 90 DEGREE ANGLE

## (undated) DEVICE — MARKER SKIN STND TIP BLUE BARR

## (undated) DEVICE — SEE MEDLINE ITEM 146313

## (undated) DEVICE — SEE MEDLINE ITEM 152530

## (undated) DEVICE — PADDING CAST SPECIALIST 6X4YD

## (undated) DEVICE — SEE MEDLINE ITEM 146298

## (undated) DEVICE — TOURNIQUET SB QC SP 34X4IN

## (undated) DEVICE — TOURNIQUET SB QC DP 34X4IN

## (undated) DEVICE — KIT IRR SUCTION HND PIECE

## (undated) DEVICE — NDL SPINAL 18GX3.5 SPINOCAN

## (undated) DEVICE — GLOVE BIOGEL SKINSENSE PI 7.0

## (undated) DEVICE — SOL 9P NACL IRR PIC IL

## (undated) DEVICE — SUT VICRYL PLUS 3-0 SH 18IN

## (undated) DEVICE — DRAPE STERI INSTRUMENT 1018

## (undated) DEVICE — SET BASIN 48X48IN 6000ML RING

## (undated) DEVICE — BLADE SURG CARBON STEEL SZ11

## (undated) DEVICE — DRAPE STERI U-SHAPED 47X51IN

## (undated) DEVICE — MIXER BONE CEMENT

## (undated) DEVICE — DRAPE EMERALD 87X114.75X113

## (undated) DEVICE — SEE MEDLINE ITEM 152622

## (undated) DEVICE — HOOD T-5 TEAR AWAY STERILE

## (undated) DEVICE — BLADE 4.2MM PREBENT ULTRACUT

## (undated) DEVICE — PAD COLD THERAPY KNEE WRAP ON

## (undated) DEVICE — PAD KNEE POLAR XL

## (undated) DEVICE — GAUZE SPONGE 4X4 12PLY

## (undated) DEVICE — SUT STRATAFIX PDO 2-0 MH

## (undated) DEVICE — GLOVE BIOGEL SKINSENSE PI 8.5

## (undated) DEVICE — PAD ABDOMINAL 8X7.5 STERILE